# Patient Record
Sex: FEMALE | Race: OTHER | NOT HISPANIC OR LATINO | Employment: UNEMPLOYED | URBAN - METROPOLITAN AREA
[De-identification: names, ages, dates, MRNs, and addresses within clinical notes are randomized per-mention and may not be internally consistent; named-entity substitution may affect disease eponyms.]

---
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
## 2020-08-17 ENCOUNTER — TELEPHONE (OUTPATIENT)
Dept: OBGYN CLINIC | Facility: CLINIC | Age: 20
End: 2020-08-17

## 2020-09-22 ENCOUNTER — PROCEDURE VISIT (OUTPATIENT)
Dept: OBGYN CLINIC | Facility: CLINIC | Age: 20
End: 2020-09-22
Payer: COMMERCIAL

## 2020-09-22 VITALS
WEIGHT: 212 LBS | SYSTOLIC BLOOD PRESSURE: 116 MMHG | DIASTOLIC BLOOD PRESSURE: 82 MMHG | BODY MASS INDEX: 39.01 KG/M2 | HEIGHT: 62 IN | TEMPERATURE: 98.3 F

## 2020-09-22 DIAGNOSIS — Z30.46 NEXPLANON REMOVAL: Primary | ICD-10-CM

## 2020-09-22 DIAGNOSIS — Z11.3 ROUTINE SCREENING FOR STI (SEXUALLY TRANSMITTED INFECTION): ICD-10-CM

## 2020-09-22 DIAGNOSIS — Z30.011 ENCOUNTER FOR INITIAL PRESCRIPTION OF CONTRACEPTIVE PILLS: ICD-10-CM

## 2020-09-22 PROBLEM — Z00.00 VISIT FOR PREVENTIVE HEALTH EXAMINATION: Status: RESOLVED | Noted: 2018-07-23 | Resolved: 2020-09-22

## 2020-09-22 PROBLEM — J30.1 SEASONAL ALLERGIC RHINITIS DUE TO POLLEN: Status: RESOLVED | Noted: 2018-05-03 | Resolved: 2020-09-22

## 2020-09-22 PROBLEM — J06.9 VIRAL URI WITH COUGH: Status: RESOLVED | Noted: 2019-10-31 | Resolved: 2020-09-22

## 2020-09-22 PROCEDURE — 87491 CHLMYD TRACH DNA AMP PROBE: CPT | Performed by: NURSE PRACTITIONER

## 2020-09-22 PROCEDURE — 87591 N.GONORRHOEAE DNA AMP PROB: CPT | Performed by: NURSE PRACTITIONER

## 2020-09-22 PROCEDURE — 11982 REMOVE DRUG IMPLANT DEVICE: CPT | Performed by: NURSE PRACTITIONER

## 2020-09-22 RX ORDER — LEVONORGESTREL AND ETHINYL ESTRADIOL 0.1-0.02MG
1 KIT ORAL DAILY
Qty: 30 TABLET | Refills: 3 | Status: SHIPPED | OUTPATIENT
Start: 2020-09-22 | End: 2020-12-10

## 2020-09-22 NOTE — PATIENT INSTRUCTIONS
Safe sex  Safe Sex   WHAT YOU NEED TO KNOW:   What is safe sex? Safe sex is a combination of practices you can do to prevent pregnancy and the spread of sexually transmitted infections (STIs)  These practices help to decrease or prevent the exchange of body fluids during sexual contact  Body fluids include saliva, urine, blood, vaginal fluids, and semen  All types of sex can cause STIs  This includes oral, vaginal, and anal sex  How do I practice safe sex? Talk to your partner before you have sex  Ask about his or her sexual history and any current or past STI  · Use condoms and barrier methods for all types of sexual contact  Use a new condom or latex barrier each time you have sex  This includes oral, vaginal, and anal sex  Make sure that the condom fits and is put on correctly  Rubber latex sheets or dental dams can be used for oral sex  Ask your healthcare provider how to use these items and where to purchase them  If you are allergic to latex, use a nonlatex product such as polyurethane  · Limit your number of sexual partners  More than one sex partner can increase your risk for an STI  Do not have sex with anyone whose sexual history you do not know  · Do not do activities that can pass germs  Do not use saliva as a lubricant or share sex toys  · Tell your sex partner if you have an STI  Your partner may need to be tested and treated  Do not have sex while you are being treated for an STI, or with a partner who is being treated  · Get tested regularly for STIs  Get tested if you have had sexual contact with someone who has an STI  Get tested if you have unprotected sex with any new partner  · Get vaccinated  Vaccines may help to lower your risk for an STI such as HPV, hepatitis A, or hepatitis B  Ask your healthcare provider for more information on vaccines  How else can I practice safe sex? · Only use water-based lubricants during sex    Water-based lubricants may prevent sores or cuts in the vagina or penis  Prevent sores or cuts to decrease your risk for an STI  Do not use oil-based lubricants, such as baby oil or hand lotion, with latex condoms or barriers  These will weaken the latex and may cause it to break  · Do not use chemical irritants on condoms or genitals  Products that contain chemical irritants, such as spermicides, can irritate the lining of your vagina or rectum  Irritation may cause sores that may increase your risk for an STI  · Be careful when you have sex if you have open sores or cuts  Open sores or cuts may increase your risk for an STI  This includes new piercings and tattoos  Keep all open sores or cuts covered during sex  Do not have oral sex if you have cuts or sores in your mouth  Ask your healthcare provider when it is safe to have sex after you get a tattoo or piercing  · Do not use alcohol or drugs before sex  These substances can prevent you from thinking clearly and increase your risk for unsafe sex  Where can I find more information? · [id number] [address] ([employer])  [address]  Web Address: http://www "[employer]/  org  When should I seek immediate care? · A condom breaks, leaks, or slips off while you are having sex  · You notice sores on your penis, vagina, anal area, or the skin around them  · You have had unsafe sex and want to discuss emergency contraception or treatment for STI exposure  When should I contact my healthcare provider? · You think you might be pregnant  · You have questions or concerns about your condition or care  CARE AGREEMENT:   Informed consent  is a legal document that explains the tests, treatments, or procedures that you may need  Informed consent means you understand what will be done and can make decisions about what you want  You give your permission when you sign the consent form   You can have someone sign this form for you if you are not able to sign it  You have the right to understand your medical care in words you know  Before you sign the consent form, understand the risks and benefits of what will be done  Make sure all your questions are answered  The above information is an  only  It is not intended as medical advice for individual conditions or treatments  Talk to your doctor, nurse or pharmacist before following any medical regimen to see if it is safe and effective for you  © 2017 [address] Information is for End User's use only and may not be sold, redistributed or otherwise used for commercial purposes  All illustrations and images included in CareNotes® are the copyrighted property of [employer]  or [employer]  Levonorgestrel/Ethinyl Estradiol (By mouth)   Ethinyl Estradiol (ETH-i-nil es-tra-DYE-ol), Levonorgestrel ([id number]-hjl-ych-KAILASH-trel)  Prevents pregnancy  Brand Name(s): Kwame Piedra, Sahra Ledbetter, Felicia, Landry Becker, Richard Rodriguez, Susi, Irma [place], [place], [address], [place], [place]   There may be other brand names for this medicine  When This Medicine Should Not Be Used: You should not use this medicine if you have had an allergic reaction to hormone medicines  Do not use this medicine if you are pregnant, or if you have abnormal vaginal bleeding that has not been checked by a doctor  You should not use this medicine if you have a history of cancer of the breast, ovary, or uterus  Do not use this medicine if you have a history of heart attacks, stroke, or blood clots  You should not use this medicine if you have liver cancer, or a history of jaundice (yellow skin or eyes) caused by pregnancy or birth control pills  How to Use This Medicine:   Tablet  · Your doctor will tell you how much medicine to use  Do not use more than directed  · Read and follow the patient instructions that come with this medicine   Talk to your doctor or pharmacist if you have any questions  · Carefully follow your doctor's instructions about when to start taking your medicine  You may begin taking the pills on the first day of your menstrual period, or on the Sunday after your period begins  · You may need to use a second form of birth control when you first start using this medicine  Talk with your doctor about your individual situation  Some other forms of birth control include condoms, diaphragms, or contraceptive foams or jellies  · Take this medicine at the same time every day  Birth control pills work best when there is no more than 24 hours between doses  It is very important that you take this medicine on time every day  If a dose is missed:   · This medicine has specific patient instructions on what to do if you miss a dose  Read and follow these instructions carefully, and call your doctor if you have any questions  · If you miss one pink pill, take it as soon as you can  Then take your next pill at the regular time  This means you may take two pills in one day  · If you miss two pink pills in a row during Week 1 or 2, take two pills as soon as you can  Take two more pills on the next day  Then go back to your regular schedule of taking one pill every day  Use a second form of birth control until you have been taking pink pills for seven days in a row  · If you started this medicine on Day 1 of your period and you miss two pink pills in a row during Week 3, throw out the rest of your pills and start a new pack the same day  If you miss three or more pink pills in a row during any week, throw out the rest of your pills and start a new pack the same day  · If you started this medicine on the Sunday after your period started and you miss two pink pills in a row during Week 3, keep taking one pill every day until the next Sunday  Then throw away the rest of your pills and start a new pack on that same Sunday    · If you started this medicine on the Sunday after your period started and you miss three or more pink pills in a row during any week, keep taking one pill every day until the next Sunday  Then throw away the rest of your pills and start a new pack on that same Sunday  · If you miss your pills and change your schedule, you may not have a period for that month  Make sure your doctor knows if you miss your period two months in a row, because you may be pregnant  · You could have light bleeding or spotting any time you do not take a pill on time  The more pills you miss, the more likely you are to have bleeding  · To prevent a pregnancy, you should use a second form of birth control for the next seven days after you miss a dose  Some other forms of birth control include condoms, diaphragms, or contraceptive foams or jellies  How to Store and Dispose of This Medicine:   · Store the medicine in a closed container at room temperature, away from heat, moisture, and direct light  · Ask your pharmacist, doctor, or health caregiver about the best way to dispose of any outdated medicine or medicine no longer needed  · Keep all medicine out of the reach of children  Never share your medicine with anyone  Drugs and Foods to Avoid:   Ask your doctor or pharmacist before using any other medicine, including over-the-counter medicines, vitamins, and herbal products  · Make sure your doctor knows if you are using phenylbutazone (Butazolidin®) or rifampin (Rifadin®, Rimactane®)  Tell your doctor if you use seizure medicines such as phenobarbital or phenytoin (Dilantin®)  Make sure your doctor knows if you are using any antibiotics such as ampicillin, griseofulvin, or tetracycline  Warnings While Using This Medicine:   · Make sure your doctor knows if you are breast feeding, or have recently been pregnant  Tell your doctor if you have heart disease, high cholesterol, or high triglycerides (lipids)   Tell your doctor if you have migraine headaches, diabetes, gallbladder disease, or a history of depression  · This medicine may increase your risk of certain heart or blood vessel problems  Tell your doctor if you have a history of heart attack, stroke, high blood pressure, congestive heart failure, blood clots, or circulation problems  · Your risk of heart disease or stroke from this medicine is higher if you smoke  Your risk is also increased if you have diabetes or high cholesterol, or if you are overweight  Talk with your doctor about ways to stop smoking  Keep your diabetes under control  Ask your doctor about diet and exercise to control your weight and blood cholesterol level  · This medicine may also increase your risk of certain types of cancer  Talk to your doctor if you have concerns about this risk  · You might have some light bleeding or spotting when you first start using this medicine  This is usually normal and should not last long  However, if you have heavy bleeding or the bleeding lasts more than seven days in a row, call your doctor's office  You should not have a "normal" menstrual period until you start taking the white pills  The white pills are the last seven pills in your package and do not contain hormones  · Call your doctor for a pregnancy test if your menstrual period does not start when you take the last seven white pills  · Tell any doctor or dentist who treats you that you are using this medicine  This medicine may affect certain medical test results  · Your doctor will check your progress and the effects of this medicine at regular visits  Keep all appointments  · This medicine will not protect you from getting HIV, AIDS, or other sexually transmitted diseases  If this is a concern for you, talk with your doctor    Possible Side Effects While Using This Medicine:   Call your doctor right away if you notice any of these side effects:  · Allergic reaction: Itching or hives, swelling in your face or hands, swelling or tingling in your mouth or throat, chest tightness, trouble breathing  · Breast changes or lumps  · Decrease in how much or how often you urinate  · Chest pain, or coughing up blood  · Lighter or heavier menstrual periods  · Numbness or weakness in your arm or leg, or on one side of your body  · Pain in your lower leg (calf)  · Pain in your upper right stomach  · Shortness of breath, cold sweat, and bluish-colored skin  · Sudden or severe headache, problems with vision, speech, or walking  · Sweating, nausea or vomiting, pain in your chest, jaw, and left arm  · Swelling in your hands, ankles, or feet  · Unusual bleeding or bruising  · Yellowing of your skin or the whites of your eyes  If you notice these less serious side effects, talk with your doctor:   · Acne, increased non-scalp hair growth, or darkened skin on your face  · Breast pain, swelling, or tenderness  · Migraine headache, or a headache with vision changes or sensitivity to light  · Mood changes, depression, nervousness, or dizziness  · Nausea, vomiting, diarrhea, or upset stomach  · Pain or burning with urination  · Problems with vision, or trouble wearing contact lenses  · Vaginal itching or discharge  · Weight gain or loss, or appetite changes  If you notice other side effects that you think are caused by this medicine, tell your doctor  Call your doctor for medical advice about side effects  You may report side effects to FDA at 7-356-FDA-7155  © 2017 [address] [employer] Information is for End User's use only and may not be sold, redistributed or otherwise used for commercial purposes  The above information is an  only  It is not intended as medical advice for individual conditions or treatments  Talk to your doctor, nurse or pharmacist before following any medical regimen to see if it is safe and effective for you  Birth Control Pills   WHAT YOU NEED TO KNOW:   What are birth control pills?   Birth control pills are also called oral contraceptives, or the pill  It is medicine that helps prevent pregnancy  Birth control pills work by preventing ovulation  Ovulation is when the ovaries make and release an egg cell each month  If this egg gets fertilized by sperm, pregnancy occurs  Birth control pills may also help to prevent pregnancy by keeping sperm from fertilizing an egg  What may be done before I can start taking birth control pills? You need to see your healthcare provider to get a prescription  Any of the following may be done before your healthcare provider gives you a prescription:  · Your healthcare provider will ask you about diseases and illnesses you have had in the past  He will check your risk for blood clots, heart conditions, or stroke  He will also check your blood pressure, and may do a breast and pelvic exam  A Pap smear may also be done during the pelvic exam  This is a test to make sure you do not have abnormal changes on your cervix  You may need other tests, such as a urine test, to make sure you are not pregnant  · Your healthcare provider will ask if you take any medicines and if you smoke  Smoking increases your risk for stroke, heart attack, or a blood clot in your lungs  If you smoke, you should not take certain kinds of birth control pills  What are the advantages of birth control pills? When birth control pills are used correctly, the chances of getting pregnant are very low  Birth control pills may help decrease bleeding and pain during your monthly period  They may also help prevent cancer of the uterus and ovaries  What are the disadvantages of birth control pills? You may have sudden changes in your mood or feelings while you take birth control pills  You may have nausea and decreased sex drive  You may have an increased appetite and rapid weight gain  You may also have bleeding in between periods, less frequent periods, vaginal dryness, and breast pain   Birth control pills will not protect you from sexually transmitted infections  Rarely, some birth control pills can increase your risk for a blood clot  This may become life-threatening  What should I do if I decide I want to get pregnant? If you are planning to have a baby, ask your healthcare provider when you may stop taking your birth control pills  It may take some time for you to start ovulating again  Ask your healthcare provider for more information about pregnancy after birth control pills  When should I start taking birth control pills after I have a baby? If you are not breastfeeding, you may start taking birth control pills 3 weeks after you give birth  You may be able to take certain types of birth control pills if you are breastfeeding  These pills can be started from 6 weeks to 6 months after you give birth  Ask your healthcare provider for more information about when to start taking birth control pills after you give birth  When should I contact my healthcare provider? · You have forgotten to take a birth control pill  · You have mood changes, such as depression, since starting birth control pills  · You have nausea or you are vomiting  · You have severe abdominal pain  · You missed a period and have questions or concerns about being pregnant  · You still have bleeding 4 months after taking birth control pills correctly  · You have questions or concerns about your condition or care  When should I seek immediate care or call 911? · Your arm or leg feels warm, tender, and painful  It may look swollen and red  · You feel lightheaded, short of breath, and have chest pain  · You cough up blood  · You have any of the following signs of a stroke:      ¨ Numbness or drooping on one side of your face     ¨ Weakness in an arm or leg    ¨ Confusion or difficulty speaking    ¨ Dizziness, a severe headache, or vision loss    · You have severe pain, numbness, or swelling in your arms or legs    CARE AGREEMENT:   You have the right to help plan your care  Learn about your health condition and how it may be treated  Discuss treatment options with your caregivers to decide what care you want to receive  You always have the right to refuse treatment  The above information is an  only  It is not intended as medical advice for individual conditions or treatments  Talk to your doctor, nurse or pharmacist before following any medical regimen to see if it is safe and effective for you  © 2017 [address] Information is for End User's use only and may not be sold, redistributed or otherwise used for commercial purposes  All illustrations and images included in CareNotes® are the copyrighted property of [employer]  or [employer]  Levonorgestrel/Ethinyl Estradiol (By mouth)   Ethinyl Estradiol (ETH-i-nil es-tra-DYE-ol), Levonorgestrel ([id number]-pxj-djp-KAILASH-trel)  Prevents pregnancy  Brand Name(s): Marlon Martinez, Sahra Ledbetter, Felicia, Rosita, [place], Richard Rodriguez, Susi, Irma [place], [place], [address], [place], [place]   There may be other brand names for this medicine  When This Medicine Should Not Be Used: You should not use this medicine if you have had an allergic reaction to hormone medicines  Do not use this medicine if you are pregnant, or if you have abnormal vaginal bleeding that has not been checked by a doctor  You should not use this medicine if you have a history of cancer of the breast, ovary, or uterus  Do not use this medicine if you have a history of heart attacks, stroke, or blood clots  You should not use this medicine if you have liver cancer, or a history of jaundice (yellow skin or eyes) caused by pregnancy or birth control pills  How to Use This Medicine:   Tablet  · Your doctor will tell you how much medicine to use  Do not use more than directed  · Read and follow the patient instructions that come with this medicine   Talk to your doctor or pharmacist if you have any questions  · Carefully follow your doctor's instructions about when to start taking your medicine  You may begin taking the pills on the first day of your menstrual period, or on the Sunday after your period begins  · You may need to use a second form of birth control when you first start using this medicine  Talk with your doctor about your individual situation  Some other forms of birth control include condoms, diaphragms, or contraceptive foams or jellies  · Take this medicine at the same time every day  Birth control pills work best when there is no more than 24 hours between doses  It is very important that you take this medicine on time every day  If a dose is missed:   · This medicine has specific patient instructions on what to do if you miss a dose  Read and follow these instructions carefully, and call your doctor if you have any questions  · If you miss one pink pill, take it as soon as you can  Then take your next pill at the regular time  This means you may take two pills in one day  · If you miss two pink pills in a row during Week 1 or 2, take two pills as soon as you can  Take two more pills on the next day  Then go back to your regular schedule of taking one pill every day  Use a second form of birth control until you have been taking pink pills for seven days in a row  · If you started this medicine on Day 1 of your period and you miss two pink pills in a row during Week 3, throw out the rest of your pills and start a new pack the same day  If you miss three or more pink pills in a row during any week, throw out the rest of your pills and start a new pack the same day  · If you started this medicine on the Sunday after your period started and you miss two pink pills in a row during Week 3, keep taking one pill every day until the next Sunday  Then throw away the rest of your pills and start a new pack on that same Sunday    · If you started this medicine on the Sunday after your period started and you miss three or more pink pills in a row during any week, keep taking one pill every day until the next Sunday  Then throw away the rest of your pills and start a new pack on that same Sunday  · If you miss your pills and change your schedule, you may not have a period for that month  Make sure your doctor knows if you miss your period two months in a row, because you may be pregnant  · You could have light bleeding or spotting any time you do not take a pill on time  The more pills you miss, the more likely you are to have bleeding  · To prevent a pregnancy, you should use a second form of birth control for the next seven days after you miss a dose  Some other forms of birth control include condoms, diaphragms, or contraceptive foams or jellies  How to Store and Dispose of This Medicine:   · Store the medicine in a closed container at room temperature, away from heat, moisture, and direct light  · Ask your pharmacist, doctor, or health caregiver about the best way to dispose of any outdated medicine or medicine no longer needed  · Keep all medicine out of the reach of children  Never share your medicine with anyone  Drugs and Foods to Avoid:   Ask your doctor or pharmacist before using any other medicine, including over-the-counter medicines, vitamins, and herbal products  · Make sure your doctor knows if you are using phenylbutazone (Butazolidin®) or rifampin (Rifadin®, Rimactane®)  Tell your doctor if you use seizure medicines such as phenobarbital or phenytoin (Dilantin®)  Make sure your doctor knows if you are using any antibiotics such as ampicillin, griseofulvin, or tetracycline  Warnings While Using This Medicine:   · Make sure your doctor knows if you are breast feeding, or have recently been pregnant  Tell your doctor if you have heart disease, high cholesterol, or high triglycerides (lipids)   Tell your doctor if you have migraine headaches, diabetes, gallbladder disease, or a tightness, trouble breathing  · Breast changes or lumps  · Decrease in how much or how often you urinate  · Chest pain, or coughing up blood  · Lighter or heavier menstrual periods  · Numbness or weakness in your arm or leg, or on one side of your body  · Pain in your lower leg (calf)  · Pain in your upper right stomach  · Shortness of breath, cold sweat, and bluish-colored skin  · Sudden or severe headache, problems with vision, speech, or walking  · Sweating, nausea or vomiting, pain in your chest, jaw, and left arm  · Swelling in your hands, ankles, or feet  · Unusual bleeding or bruising  · Yellowing of your skin or the whites of your eyes  If you notice these less serious side effects, talk with your doctor:   · Acne, increased non-scalp hair growth, or darkened skin on your face  · Breast pain, swelling, or tenderness  · Migraine headache, or a headache with vision changes or sensitivity to light  · Mood changes, depression, nervousness, or dizziness  · Nausea, vomiting, diarrhea, or upset stomach  · Pain or burning with urination  · Problems with vision, or trouble wearing contact lenses  · Vaginal itching or discharge  · Weight gain or loss, or appetite changes  If you notice other side effects that you think are caused by this medicine, tell your doctor  Call your doctor for medical advice about side effects  You may report side effects to FDA at 9-960-FDA-4533  © 2017 [facility] Information is for End User's use only and may not be sold, redistributed or otherwise used for commercial purposes  The above information is an  only  It is not intended as medical advice for individual conditions or treatments  Talk to your doctor, nurse or pharmacist before following any medical regimen to see if it is safe and effective for you

## 2020-09-22 NOTE — PROGRESS NOTES
Remove and insert drug implant    Date/Time: 9/22/2020 3:35 PM  Performed by: LIVIA Chaudhry  Authorized by: LIVIA Chaudhry     Consent:     Consent obtained:  Verbal    Consent given by:  Patient    Procedural risks discussed:  Bleeding, failure rate, infection and repeat procedure    Patient questions answered: yes      Patient agrees, verbalizes understanding, and wants to proceed: yes      Educational handouts given: yes      Instructions and paperwork completed: yes    Universal Protocol:     Relevant documents present and verified: yes      Test results available and properly labeled: na       Imaging studies available: na       Required blood products, implants, devices, and special equipment available: yes      Site marked: yes    Indication:     Indication: Presence of non-biodegradable drug delivery implant    Pre-procedure:     Pre-procedure timeout performed: yes      Anesthesia premedication: none  Prepped with: alcohol 70% and povidone-iodine      Local anesthetic:  Lidocaine 1%    The site was cleaned and prepped in a sterile fashion: yes    Procedure:     Procedure:  Removal    Small stab incision was made in arm: yes      Left/right:  Left    Visualization of implant was obtained: yes      Site was closed with steri-strips and pressure bandage applied: yes    Comments:      G0 here for Nexplanon removal and to initiate OCPs  Nexplanon was placed 1/2018 is due for removal 1/2021  Patient not interested in replacement of Nexplanon is interested in starting OCPs  Reviewed risks, benefits and alternatives  Removal procedure reviewed  Patient desires removal today  Urine sent for gonorrhea/chlamydia  Reviewed with patient to start birth control pills tonight  One tablet daily  Reviewed with patient common side effect is breast tenderness and nausea  Reviewed with patient given Nexplanon use unsure when menses will become normal typically within the next 6 months  ACHES reviewed  Missed pill protocol reviewed  Written information provided  Removed without difficulty, intact  Signs and symptoms report reviewed  Reviewed recommendation for Gardasil vaccine  Written information provided  Patient is unsure she had vaccine series    RTO 1/2021 for OCP check and annual exam with 1st Pap smear f/u gardasil vaccine

## 2020-10-01 LAB
C TRACH DNA SPEC QL NAA+PROBE: NEGATIVE
N GONORRHOEA DNA SPEC QL NAA+PROBE: NEGATIVE

## 2020-12-10 ENCOUNTER — TELEMEDICINE (OUTPATIENT)
Dept: FAMILY MEDICINE CLINIC | Facility: CLINIC | Age: 20
End: 2020-12-10
Payer: COMMERCIAL

## 2020-12-10 VITALS — TEMPERATURE: 98.6 F

## 2020-12-10 DIAGNOSIS — B34.9 VIRAL INFECTION, UNSPECIFIED: Primary | ICD-10-CM

## 2020-12-10 PROCEDURE — 99214 OFFICE O/P EST MOD 30 MIN: CPT | Performed by: FAMILY MEDICINE

## 2021-02-08 ENCOUNTER — OFFICE VISIT (OUTPATIENT)
Dept: OBGYN CLINIC | Facility: CLINIC | Age: 21
End: 2021-02-08
Payer: COMMERCIAL

## 2021-02-08 ENCOUNTER — TELEPHONE (OUTPATIENT)
Dept: OBGYN CLINIC | Facility: CLINIC | Age: 21
End: 2021-02-08

## 2021-02-08 ENCOUNTER — NURSE TRIAGE (OUTPATIENT)
Dept: OTHER | Facility: OTHER | Age: 21
End: 2021-02-08

## 2021-02-08 VITALS — SYSTOLIC BLOOD PRESSURE: 118 MMHG | BODY MASS INDEX: 37.02 KG/M2 | DIASTOLIC BLOOD PRESSURE: 68 MMHG | WEIGHT: 202.4 LBS

## 2021-02-08 DIAGNOSIS — Z32.01 POSITIVE URINE PREGNANCY TEST: ICD-10-CM

## 2021-02-08 DIAGNOSIS — R11.2 NAUSEA AND VOMITING, INTRACTABILITY OF VOMITING NOT SPECIFIED, UNSPECIFIED VOMITING TYPE: Primary | ICD-10-CM

## 2021-02-08 PROCEDURE — 99213 OFFICE O/P EST LOW 20 MIN: CPT | Performed by: NURSE PRACTITIONER

## 2021-02-08 RX ORDER — ONDANSETRON 4 MG/1
4 TABLET, ORALLY DISINTEGRATING ORAL EVERY 8 HOURS SCHEDULED
Qty: 18 TABLET | Refills: 0 | Status: SHIPPED | OUTPATIENT
Start: 2021-02-08 | End: 2021-02-25 | Stop reason: SDUPTHER

## 2021-02-08 NOTE — PROGRESS NOTES
Assessment/Plan:    Nausea and vomiting  N/V x24 hours  Pos UPT; LMP 12/17/20  Discussed 1st trimester N/V of preg versus viral gastroenteritis  Offered rx for Zofran; reviewed risks/benefits, alternatives  Recommended ice chips then advance to small sips gatorade/h20; bland diet when tolerated  Reviewed sx of dehydration and reasons to call versus report to ED  Dating [address] is scheduled for later this week - advised to keep this  She verbalizes understanding and agrees to plan  Diagnoses and all orders for this visit:    Nausea and vomiting, intractability of vomiting not specified, unspecified vomiting type  -     ondansetron (ZOFRAN-ODT) 4 mg disintegrating tablet; Take 1 tablet (4 mg total) by mouth every 8 (eight) hours    Positive urine pregnancy test          Subjective:      Patient ID: Mimi Hurtado is a 24 y o  female  This new patient presents with c/o nausea and vomiting in 1st trimester of preg  She is transferring care from another [facility] practice  Shai Roberto is a 24 y o  G0  PMHx noncontrib  Gyn hx noncontrib  FH notable for maunt with breast ca; neg for ovarian or colon ca  LMP was 12/17/20  Pos UPT x3  Felt nauseated for about 1 week then started vomiting yesterday  Her emesis is only bile as of this morning   Denies fever, chills or sick contacts   She is voiding   Has not taken OTC meds   Denies bleeding or pelvic pain  She is scheduled for dating US on 2/11/21      The following portions of the patient's history were reviewed and updated as appropriate: allergies, current medications, past family history, past medical history, past social history, past surgical history and problem list     Review of Systems   Constitutional: Negative  Respiratory: Negative  Cardiovascular: Negative  Gastrointestinal: Positive for nausea and vomiting  Negative for abdominal pain, anal bleeding, blood in stool, constipation and diarrhea  Genitourinary: Negative  Musculoskeletal: Negative      Skin: Negative  Neurological: Negative  Psychiatric/Behavioral: Negative  Objective:      /68   Wt 91 8 kg (202 lb 6 4 oz)   LMP 12/17/2020   BMI 37 02 kg/m²          Physical Exam  Constitutional:       Appearance: She is well-developed  Comments: Well appearing    HENT:      Head: Normocephalic  Eyes:      Pupils: Pupils are equal, round, and reactive to light  Neck:      Musculoskeletal: Normal range of motion  Pulmonary:      Effort: Pulmonary effort is normal    Neurological:      Mental Status: She is alert and oriented to person, place, and time  Psychiatric:         Behavior: Behavior normal          Thought Content:  Thought content normal          Judgment: Judgment normal

## 2021-02-08 NOTE — TELEPHONE ENCOUNTER
nausea with early pregnancy pt is about 6wk pregnant   [facility]  Per Diann Fish she is going to put in an order for Zofran and that she doesn't need an office visit

## 2021-02-08 NOTE — TELEPHONE ENCOUNTER
Regarding: Vomitiing  ----- Message from Shayne Thomas sent at 2/8/2021  5:00 AM EST -----  " Patient is about 6 weeks pregnant and has a appointment this Thursday to confirm appointment  She has been vomiting for the last 24 hours  It's bile and spit  Unable to eat or drink anything   Patient states it started 3 days ago "

## 2021-02-08 NOTE — PATIENT INSTRUCTIONS
Nausea and Vomiting in Pregnancy   WHAT YOU NEED TO KNOW:   Nausea and vomiting can happen any time of day  These symptoms usually start before the 9th week of pregnancy, and end by the 14th week (second trimester)  Some women can have nausea and vomiting for a longer time  These symptoms can affect some women throughout the entire pregnancy  Nausea and vomiting do not harm your baby  These symptoms can make it hard for you to do your daily activities  DISCHARGE INSTRUCTIONS:   Return to the emergency department if:   · You have signs of dehydration  Examples are dark yellow urine, dry mouth and lips, dry skin, fast heartbeat, and urinating less than usual     · You have severe abdominal pain  · You feel too weak or dizzy to stand up  · You see blood in your vomit or bowel movements  Contact your healthcare provider if:   · You vomit more than 4 times in 1 day  · You have not been able to keep liquids down for more than 1 day  · You lose more than 2 pounds  · You have a fever  · Your nausea and vomiting continue longer than 14 weeks  · You have questions or concerns about your condition or care  Nutrition changes you can make to manage nausea and vomiting:   · Eat small meals throughout the day instead of 3 large meals  You may be more likely to have nausea and vomiting when your stomach is empty  Eat foods that are low in fat and high in protein  Examples are lean meat, beans, turkey, and chicken without the skin  Eat a small snack, such as crackers, dry cereal, or a small sandwich before you go to bed  · Eat some crackers or dry toast before you get out of bed in the morning  Get out of bed slowly  Sudden movements could cause you to get dizzy and nauseated  · Eat bland foods when you feel nauseated  Examples of bland foods include dry toast, dry cereal, plain pasta, white rice, and bread  Other bland foods include saltine crackers, bananas, gelatin, and pretzels   Avoid spicy, greasy, and fried foods  Avoid any other foods that make you feel nauseated  · Drink liquids that contain ginger  Drink ginger ale made with real ginger or ginger tea made with fresh grated ginger  Ginger capsules or ginger candies may also help to decrease nausea and vomiting  · Drink liquids between meals instead of with meals  Wait at least 30 minutes after you eat to drink liquids  Drink small amounts of liquids often throughout the day to prevent dehydration  Ask how much liquid you should drink each day  Other changes you can make to manage nausea and vomiting:   · Avoid smells that bother you  Strong odors may cause nausea and vomiting to start, or make it worse  Take a short walk, turn on a fan, or try to sleep with the window open to get fresh air  When you are cooking, open windows to get rid of smells that may cause nausea  · Do not brush your teeth right after you eat  if it makes you nauseated  · Rest when you need to  Start activity slowly and work up to your usual routine as you start to feel better  · Talk to your healthcare provider about your prenatal vitamins  Prenatal vitamins can cause nausea for some women  Try taking your prenatal vitamin at night or with a snack  If this change does not help, your healthcare provider may recommend a different type of vitamin  · Do not use any medicines, vitamins, or supplements to manage your symptoms without asking your healthcare provider  Many medicines can harm an unborn baby  · Light to moderate exercise  may help to decrease your symptoms  It may also help you to sleep better at night  Ask your healthcare provider about the best exercise plan for you  Follow up with your healthcare provider as directed:  Write down your questions so you remember to ask them during your visits     © Copyright [address] Information is for End User's use only and may not be sold, redistributed or otherwise used for commercial purposes  All illustrations and images included in CareNotes® are the copyrighted property of A D A M [employer] Inc  or [employer]  The above information is an  only  It is not intended as medical advice for individual conditions or treatments  Talk to your doctor, nurse or pharmacist before following any medical regimen to see if it is safe and effective for you

## 2021-02-08 NOTE — ASSESSMENT & PLAN NOTE
N/V x24 hours  Pos UPT; LMP 12/17/20  Discussed 1st trimester N/V of preg versus viral gastroenteritis  Offered rx for Zofran; reviewed risks/benefits, alternatives  Recommended ice chips then advance to small sips gatorade/h20; bland diet when tolerated  Reviewed sx of dehydration and reasons to call versus report to ED  Dating [address] is scheduled for later this week - advised to keep this  She verbalizes understanding and agrees to plan

## 2021-02-09 ENCOUNTER — TELEPHONE (OUTPATIENT)
Dept: OBGYN CLINIC | Facility: CLINIC | Age: 21
End: 2021-02-09

## 2021-02-09 ENCOUNTER — HOSPITAL ENCOUNTER (EMERGENCY)
Facility: HOSPITAL | Age: 21
Discharge: HOME | End: 2021-02-09
Attending: EMERGENCY MEDICINE | Admitting: EMERGENCY MEDICINE
Payer: COMMERCIAL

## 2021-02-09 VITALS
DIASTOLIC BLOOD PRESSURE: 66 MMHG | TEMPERATURE: 98.2 F | SYSTOLIC BLOOD PRESSURE: 133 MMHG | HEART RATE: 79 BPM | RESPIRATION RATE: 20 BRPM | OXYGEN SATURATION: 100 %

## 2021-02-09 DIAGNOSIS — R82.4 KETONURIA: ICD-10-CM

## 2021-02-09 DIAGNOSIS — O21.9 NAUSEA AND VOMITING IN PREGNANCY: Primary | ICD-10-CM

## 2021-02-09 LAB
ALBUMIN SERPL BCP-MCNC: 4 G/DL (ref 3.5–5)
ALP SERPL-CCNC: 53 U/L (ref 46–116)
ALT SERPL W P-5'-P-CCNC: 16 U/L (ref 12–78)
AMORPH URATE CRY URNS QL MICRO: ABNORMAL /HPF
ANION GAP SERPL CALCULATED.3IONS-SCNC: 6 MMOL/L (ref 4–13)
AST SERPL W P-5'-P-CCNC: 12 U/L (ref 5–45)
BACTERIA UR QL AUTO: ABNORMAL /HPF
BASOPHILS # BLD AUTO: 0.02 THOUSANDS/ΜL (ref 0–0.1)
BASOPHILS NFR BLD AUTO: 0 % (ref 0–1)
BILIRUB SERPL-MCNC: 0.54 MG/DL (ref 0.2–1)
BILIRUB UR QL STRIP: NEGATIVE
BUN SERPL-MCNC: 7 MG/DL (ref 5–25)
CALCIUM SERPL-MCNC: 10.2 MG/DL (ref 8.3–10.1)
CHLORIDE SERPL-SCNC: 106 MMOL/L (ref 100–108)
CLARITY UR: CLEAR
CO2 SERPL-SCNC: 25 MMOL/L (ref 21–32)
COLOR UR: YELLOW
COLOR, POC: NORMAL
CREAT SERPL-MCNC: 0.59 MG/DL (ref 0.6–1.3)
EOSINOPHIL # BLD AUTO: 0.08 THOUSAND/ΜL (ref 0–0.61)
EOSINOPHIL NFR BLD AUTO: 1 % (ref 0–6)
ERYTHROCYTE [DISTWIDTH] IN BLOOD BY AUTOMATED COUNT: 11.9 % (ref 11.6–15.1)
GFR SERPL CREATININE-BSD FRML MDRD: 131 ML/MIN/1.73SQ M
GLUCOSE SERPL-MCNC: 82 MG/DL (ref 65–140)
GLUCOSE UR STRIP-MCNC: NEGATIVE MG/DL
HCT VFR BLD AUTO: 38.6 % (ref 34.8–46.1)
HGB BLD-MCNC: 13.2 G/DL (ref 11.5–15.4)
HGB UR QL STRIP.AUTO: NEGATIVE
IMM GRANULOCYTES # BLD AUTO: 0.05 THOUSAND/UL (ref 0–0.2)
IMM GRANULOCYTES NFR BLD AUTO: 0 % (ref 0–2)
KETONES UR STRIP-MCNC: ABNORMAL MG/DL
LEUKOCYTE ESTERASE UR QL STRIP: ABNORMAL
LYMPHOCYTES # BLD AUTO: 2.71 THOUSANDS/ΜL (ref 0.6–4.47)
LYMPHOCYTES NFR BLD AUTO: 22 % (ref 14–44)
MCH RBC QN AUTO: 29.6 PG (ref 26.8–34.3)
MCHC RBC AUTO-ENTMCNC: 34.2 G/DL (ref 31.4–37.4)
MCV RBC AUTO: 87 FL (ref 82–98)
MONOCYTES # BLD AUTO: 0.84 THOUSAND/ΜL (ref 0.17–1.22)
MONOCYTES NFR BLD AUTO: 7 % (ref 4–12)
NEUTROPHILS # BLD AUTO: 8.91 THOUSANDS/ΜL (ref 1.85–7.62)
NEUTS SEG NFR BLD AUTO: 70 % (ref 43–75)
NITRITE UR QL STRIP: NEGATIVE
NON-SQ EPI CELLS URNS QL MICRO: ABNORMAL /HPF
NRBC BLD AUTO-RTO: 0 /100 WBCS
OTHER STN SPEC: ABNORMAL
PH UR STRIP.AUTO: 5.5 [PH] (ref 4.5–8)
PLATELET # BLD AUTO: 393 THOUSANDS/UL (ref 149–390)
PMV BLD AUTO: 9.1 FL (ref 8.9–12.7)
POTASSIUM SERPL-SCNC: 3.7 MMOL/L (ref 3.5–5.3)
PROT SERPL-MCNC: 7.8 G/DL (ref 6.4–8.2)
PROT UR STRIP-MCNC: ABNORMAL MG/DL
RBC # BLD AUTO: 4.46 MILLION/UL (ref 3.81–5.12)
RBC #/AREA URNS AUTO: ABNORMAL /HPF
SODIUM SERPL-SCNC: 137 MMOL/L (ref 136–145)
SP GR UR STRIP.AUTO: >=1.03 (ref 1–1.03)
UROBILINOGEN UR QL STRIP.AUTO: 0.2 E.U./DL
WBC # BLD AUTO: 12.61 THOUSAND/UL (ref 4.31–10.16)
WBC #/AREA URNS AUTO: ABNORMAL /HPF

## 2021-02-09 PROCEDURE — 96361 HYDRATE IV INFUSION ADD-ON: CPT

## 2021-02-09 PROCEDURE — 76815 OB US LIMITED FETUS(S): CPT | Performed by: EMERGENCY MEDICINE

## 2021-02-09 PROCEDURE — 81001 URINALYSIS AUTO W/SCOPE: CPT

## 2021-02-09 PROCEDURE — 87086 URINE CULTURE/COLONY COUNT: CPT

## 2021-02-09 PROCEDURE — 96375 TX/PRO/DX INJ NEW DRUG ADDON: CPT

## 2021-02-09 PROCEDURE — 96365 THER/PROPH/DIAG IV INF INIT: CPT

## 2021-02-09 PROCEDURE — 80053 COMPREHEN METABOLIC PANEL: CPT | Performed by: EMERGENCY MEDICINE

## 2021-02-09 PROCEDURE — 85025 COMPLETE CBC W/AUTO DIFF WBC: CPT | Performed by: EMERGENCY MEDICINE

## 2021-02-09 PROCEDURE — 99284 EMERGENCY DEPT VISIT MOD MDM: CPT | Performed by: EMERGENCY MEDICINE

## 2021-02-09 PROCEDURE — 36415 COLL VENOUS BLD VENIPUNCTURE: CPT | Performed by: EMERGENCY MEDICINE

## 2021-02-09 PROCEDURE — 99284 EMERGENCY DEPT VISIT MOD MDM: CPT

## 2021-02-09 RX ORDER — DOXYLAMINE SUCCINATE AND PYRIDOXINE HYDROCHLORIDE, DELAYED RELEASE TABLETS 10 MG/10 MG 10; 10 MG/1; MG/1
10 TABLET, DELAYED RELEASE ORAL 2 TIMES DAILY
Qty: 12 TABLET | Refills: 0 | Status: SHIPPED | OUTPATIENT
Start: 2021-02-09 | End: 2021-02-25

## 2021-02-09 RX ORDER — METOCLOPRAMIDE 10 MG/1
10 TABLET ORAL EVERY 6 HOURS
Qty: 12 TABLET | Refills: 0 | Status: SHIPPED | OUTPATIENT
Start: 2021-02-09 | End: 2021-02-09

## 2021-02-09 RX ORDER — ONDANSETRON 2 MG/ML
4 INJECTION INTRAMUSCULAR; INTRAVENOUS ONCE
Status: COMPLETED | OUTPATIENT
Start: 2021-02-09 | End: 2021-02-09

## 2021-02-09 RX ORDER — DOXYLAMINE SUCCINATE AND PYRIDOXINE HYDROCHLORIDE, DELAYED RELEASE TABLETS 10 MG/10 MG 10; 10 MG/1; MG/1
10 TABLET, DELAYED RELEASE ORAL 2 TIMES DAILY
Qty: 14 TABLET | Refills: 0 | Status: SHIPPED | OUTPATIENT
Start: 2021-02-09 | End: 2021-02-09

## 2021-02-09 RX ORDER — METOCLOPRAMIDE 10 MG/1
10 TABLET ORAL EVERY 6 HOURS
Qty: 12 TABLET | Refills: 0 | Status: SHIPPED | OUTPATIENT
Start: 2021-02-09 | End: 2021-03-23 | Stop reason: SDUPTHER

## 2021-02-09 RX ADMIN — ONDANSETRON 4 MG: 2 INJECTION INTRAMUSCULAR; INTRAVENOUS at 15:12

## 2021-02-09 RX ADMIN — DEXTROSE AND SODIUM CHLORIDE 1000 ML: 5; .9 INJECTION, SOLUTION INTRAVENOUS at 17:19

## 2021-02-09 RX ADMIN — SODIUM CHLORIDE 1000 ML: 0.9 INJECTION, SOLUTION INTRAVENOUS at 15:12

## 2021-02-09 NOTE — Clinical Note
Nav Dao was seen and treated in our emergency department on 2/9/2021  Diagnosis:     Anna Armstrong  may return to work on return date  She may return on this date: 02/11/2021         If you have any questions or concerns, please don't hesitate to call        Pradeep Yee MD    ______________________________           _______________          _______________  Hospital Representative                              Date                                Time

## 2021-02-09 NOTE — ED PROCEDURE NOTE
Procedure  POC Pelvic US    Date/Time: 2/9/2021 3:05 PM  Performed by: Erika Ramirez MD  Authorized by: Erika Ramirez MD     Patient location:  ED  Other Assisting Provider: Yes (comment) (Dr Elan Dudley)    Procedure details:     Exam Type:  Diagnostic    Indications: evaluate for IUP      Assessment for: confirm intrauterine pregnancy      Technique:  Transabdominal obstetric (HCG+) exam    Views obtained: uterus (transverse and sagittal)      Image quality: diagnostic      Image availability:  Images available in PACS  Uterine findings:     Intrauterine pregnancy: identified      Single gestation: identified      Yolk sac: identified      Fetal pole: identified    Other findings:     Free pelvic fluid: not identified    Interpretation:     Ultrasound impressions: normal      Pregnancy findings: intrauterine pregnancy (IUP)                       Erika Ramirez MD  02/09/21 1502

## 2021-02-09 NOTE — ED PROVIDER NOTES
History  Chief Complaint   Patient presents with    Vomiting During Pregnancy     7wks pregnancy vomiting for weeks, OBGYN told her to come to ED for fluids  26-year-old female  presents for vomiting during first-trimester pregnancy estimated to be around 7 weeks based on last menstrual period here with nausea and vomiting  Patient says nausea started 4 days ago and she started vomiting 2 days ago  She has had an episode of vomiting every 2 hours as of this morning  She has been taking Zofran without relief  Has some mild lower abdominal cramping in the suprapubic area, nonradiating, 3/10 severity, intermittent, worse with episodes of vomiting  Denies fever, chills, cough, chest pain, SOB, diarrhea, urinary symptoms, back or flank pain, vaginal bleeding or discharge, headache, any other complaints  She has not had an ultrasound at this point  Prior to Admission Medications   Prescriptions Last Dose Informant Patient Reported? Taking?   ondansetron (ZOFRAN-ODT) 4 mg disintegrating tablet 2021 at Unknown time  No Yes   Sig: Take 1 tablet (4 mg total) by mouth every 8 (eight) hours      Facility-Administered Medications: None       History reviewed  No pertinent past medical history  History reviewed  No pertinent surgical history  Family History   Problem Relation Age of Onset    No Known Problems Mother     Diabetes Paternal Grandmother     Breast cancer Maternal Aunt      I have reviewed and agree with the history as documented  E-Cigarette/Vaping    E-Cigarette Use Current Some Day User      E-Cigarette/Vaping Substances    Nicotine Yes     THC No     CBD Yes     Flavoring Yes     Other No     Unknown No      Social History     Tobacco Use    Smoking status: Never Smoker    Smokeless tobacco: Never Used   Substance Use Topics    Alcohol use: No    Drug use: No        Review of Systems   Constitutional: Negative  Negative for chills and fever  HENT: Negative  Negative for rhinorrhea  Eyes: Negative  Respiratory: Negative  Negative for cough and shortness of breath  Cardiovascular: Negative  Negative for chest pain and leg swelling  Gastrointestinal: Positive for nausea and vomiting  Negative for abdominal pain and diarrhea  Genitourinary: Positive for pelvic pain  Negative for dysuria, flank pain and frequency  Musculoskeletal: Negative  Negative for back pain and neck pain  Skin: Negative  Negative for rash  Neurological: Negative  Negative for light-headedness and headaches  All other systems reviewed and are negative  Physical Exam  ED Triage Vitals [02/09/21 1443]   Temperature Pulse Respirations Blood Pressure SpO2   98 2 °F (36 8 °C) 79 20 133/66 100 %      Temp Source Heart Rate Source Patient Position - Orthostatic VS BP Location FiO2 (%)   Oral -- -- -- --      Pain Score       --             Orthostatic Vital Signs  Vitals:    02/09/21 1443   BP: 133/66   Pulse: 79       Physical Exam  Vitals signs and nursing note reviewed  Constitutional:       General: She is not in acute distress  Appearance: She is well-developed  HENT:      Head: Normocephalic and atraumatic  Mouth/Throat:      Mouth: Mucous membranes are moist    Eyes:      Pupils: Pupils are equal, round, and reactive to light  Neck:      Musculoskeletal: Normal range of motion and neck supple  Cardiovascular:      Rate and Rhythm: Normal rate and regular rhythm  Pulses:           Radial pulses are 2+ on the right side and 2+ on the left side  Heart sounds: Normal heart sounds  No murmur  No friction rub  No gallop  Pulmonary:      Effort: Pulmonary effort is normal  No respiratory distress  Breath sounds: Normal breath sounds  No stridor  No wheezing, rhonchi or rales  Abdominal:      General: Abdomen is flat  Palpations: Abdomen is soft  Tenderness: There is no abdominal tenderness  There is no guarding or rebound  Musculoskeletal: Normal range of motion  General: No swelling or tenderness  Right lower leg: No edema  Left lower leg: No edema  Skin:     General: Skin is warm and dry  Capillary Refill: Capillary refill takes less than 2 seconds  Neurological:      Mental Status: She is alert and oriented to person, place, and time  Cranial Nerves: No cranial nerve deficit  Comments: Clear fluent speech         ED Medications  Medications   sodium chloride 0 9 % bolus 1,000 mL (0 mL Intravenous Stopped 2/9/21 1715)   ondansetron (ZOFRAN) injection 4 mg (4 mg Intravenous Given 2/9/21 1512)   dextrose 5 % and sodium chloride 0 9 % bolus 1,000 mL (0 mL Intravenous Stopped 2/9/21 1828)       Diagnostic Studies  Results Reviewed     Procedure Component Value Units Date/Time    Urine Microscopic [[id number]]  (Abnormal) Collected: 02/09/21 1712    Lab Status: Final result Specimen: Urine, Clean Catch Updated: 02/09/21 1802     RBC, UA None Seen /hpf      WBC, UA 4-10 /hpf      Epithelial Cells Moderate /hpf      Bacteria, UA Occasional /hpf      AMORPH URATES Moderate /hpf      OTHER OBSERVATIONS Yeast Cells Present    Urine culture [[id number]] Collected: 02/09/21 1712    Lab Status:  In process Specimen: Urine, Clean Catch Updated: 02/09/21 1719    POCT urinalysis dipstick [[id number]]  (Normal) Resulted: 02/09/21 1715    Lab Status: Final result Updated: 02/09/21 1715     Color, UA see results    Urine Macroscopic, POC [[id number]]  (Abnormal) Collected: 02/09/21 1712    Lab Status: Final result Specimen: Urine Updated: 02/09/21 1713     Color, UA Yellow     Clarity, UA Clear     pH, UA 5 5     Leukocytes, UA Trace     Nitrite, UA Negative     Protein, UA Trace mg/dl      Glucose, UA Negative mg/dl      Ketones, UA >=160 (4+) mg/dl      Urobilinogen, UA 0 2 E U /dl      Bilirubin, UA Negative     Blood, UA Negative     Specific Gravity, UA >=1 030    Narrative:      CLINITEK RESULT    Comprehensive metabolic panel [[id number]]  (Abnormal) Collected: 02/09/21 1512    Lab Status: Final result Specimen: Blood from Arm, Right Updated: 02/09/21 1546     Sodium 137 mmol/L      Potassium 3 7 mmol/L      Chloride 106 mmol/L      CO2 25 mmol/L      ANION GAP 6 mmol/L      BUN 7 mg/dL      Creatinine 0 59 mg/dL      Glucose 82 mg/dL      Calcium 10 2 mg/dL      AST 12 U/L      ALT 16 U/L      Alkaline Phosphatase 53 U/L      Total Protein 7 8 g/dL      Albumin 4 0 g/dL      Total Bilirubin 0 54 mg/dL      eGFR 131 ml/min/1 73sq m     Narrative:      National Kidney Disease Foundation guidelines for Chronic Kidney Disease (CKD):     Stage 1 with normal or high GFR (GFR > 90 mL/min/1 73 square meters)    Stage 2 Mild CKD (GFR = 60-89 mL/min/1 73 square meters)    Stage 3A Moderate CKD (GFR = 45-59 mL/min/1 73 square meters)    Stage 3B Moderate CKD (GFR = 30-44 mL/min/1 73 square meters)    Stage 4 Severe CKD (GFR = 15-29 mL/min/1 73 square meters)    Stage 5 End Stage CKD (GFR <15 mL/min/1 73 square meters)  Note: GFR calculation is accurate only with a steady state creatinine    CBC and differential [[id number]]  (Abnormal) Collected: 02/09/21 1512    Lab Status: Final result Specimen: Blood from Arm, Right Updated: 02/09/21 1522     WBC 12 61 Thousand/uL      RBC 4 46 Million/uL      Hemoglobin 13 2 g/dL      Hematocrit 38 6 %      MCV 87 fL      MCH 29 6 pg      MCHC 34 2 g/dL      RDW 11 9 %      MPV 9 1 fL      Platelets 194 Thousands/uL      nRBC 0 /100 WBCs      Neutrophils Relative 70 %      Immat GRANS % 0 %      Lymphocytes Relative 22 %      Monocytes Relative 7 %      Eosinophils Relative 1 %      Basophils Relative 0 %      Neutrophils Absolute 8 91 Thousands/µL      Immature Grans Absolute 0 05 Thousand/uL      Lymphocytes Absolute 2 71 Thousands/µL      Monocytes Absolute 0 84 Thousand/µL      Eosinophils Absolute 0 08 Thousand/µL      Basophils Absolute 0 02 Thousands/µL                  No orders to display         Procedures  Procedures      ED Course                                       MDM  Number of Diagnoses or Management Options  Ketonuria:   Nausea and vomiting in pregnancy:   Diagnosis management comments: 26-year-old female  presents for vomiting during first-trimester pregnancy estimated to be around 7 weeks based on last menstrual period with nausea and vomiting  Patient has ketones on urinalysis but Chem panel is reassuring  Gave her a L of normal saline followed by D5 normal saline  Patient feels improved after IV Zofran and is tolerating oral intake  She says her nausea is resolved at this time and would like to go home  Sending home with prescriptions for antiemetics and close OB follow-up  Strict ED return precautions provided should symptoms worsen and patient can otherwise follow up outpatient  Patient expresses an understanding and agreement with the plan and remains in good condition for discharge  Disposition  Final diagnoses:   Nausea and vomiting in pregnancy   Ketonuria     Time reflects when diagnosis was documented in both MDM as applicable and the Disposition within this note     Time User Action Codes Description Comment    2021  4:57 PM Minor, Jane Add [O21 0] Hyperemesis gravidarum     2021  4:57 PM Minor, Jane Add [O21 9] Nausea and vomiting in pregnancy     2021  4:57 PM Minor, Jane Modify [O21 9] Nausea and vomiting in pregnancy     2021  4:57 PM Minor, Jane Remove [O21 0] Hyperemesis gravidarum     2021  5:15 PM Minor, Jane Add [R82 4] [facility]       ED Disposition     ED Disposition Condition Date/Time Comment    Discharge Stable  2021  6:23 PM Landry Navas discharge to home/self care              Follow-up Information     Follow up With Specialties Details Why Contact Info Additional Information    your OBGYN  Call in 1 day       [address] [place] [address] Emergency Department Emergency Medicine Go to  If symptoms worsen [address] [place] [address] Emergency Department, [address] , [place], [place], [address]   [phone]          Patient's Medications   Discharge Prescriptions    DOXYLAMINE-PYRIDOXINE 10-10 MG TBEC    Take 10 mg by mouth 2 (two) times a day       Start Date: 2/9/2021  End Date: --       Order Dose: 10 mg       Quantity: 12 tablet    Refills: 0    METOCLOPRAMIDE (REGLAN) 10 MG TABLET    Take 1 tablet (10 mg total) by mouth every 6 (six) hours       Start Date: 2/9/2021  End Date: --       Order Dose: 10 mg       Quantity: 12 tablet    Refills: 0     No discharge procedures on file  PDMP Review     None           ED Provider  Attending physically available and evaluated Teola Spatz I managed the patient along with the ED Attending      Electronically Signed by         Maria Elias MD  02/09/21 6166

## 2021-02-09 NOTE — ED ATTENDING ATTESTATION
2021  I, Cyndie Sanchez MD, saw and evaluated the patient  I have discussed the patient with the resident and agree with the resident's findings, Plan of Care, and MDM as documented in the resident's note, unless otherwise documented below  All available laboratory and imaging studies were reviewed by myself  I was present for key portions of any procedure(s) performed by the resident and I was immediately available to provide assistance  I agree with the current assessment done in the Emergency Department  I have conducted an independent evaluation of this patient  80-year-old  female in 1st trimester pregnancy at approximately 7 weeks EGA, LMP 2020, presenting with nausea and vomiting  Over the past 4 days, patient has developed nausea and has had multiple episodes of emesis for the past 2 days, consisting of stomach contents and yellow stomach fluid  No fevers or chills  Patient has tried Zofran ODT which worked for the past several days but not within the past 2 days  She has not had any diarrhea  She does have lower abdominal cramping that is nonradiating  No dysuria, no vaginal discharge or bleeding  No lightheadedness, no fevers or chills, no body aches, no chest pain, no shortness of breath  Patient does have a sick contact:  Her significant other is currently sick with a diarrheal illness  She does not believe she has had exposure to COVID and would like to defer testing for today  No prior medical history, no prior abdominal surgeries      ROS:  Constitutional: denies fevers, chills  HENT:  Reports increased sense of smell  Cardiac: no chest pain  Respiratory: no shortness of breath, no cough  GI:  Lower abdominal cramping, nausea, vomiting, no diarrhea  : no urinary frequency, urgency, or burning with urination  Heme/Onc: no easy bruising  Endocrine: no diabetes  Neuro: no weakness or numbness, no headaches    Ten systems reviewed and negative unless otherwise noted in HPI and above      Physical Exam  Vitals:    02/09/21 1443   BP: 133/66   TempSrc: Oral   Pulse: 79   Resp: 20   Temp: 98 2 °F (36 8 °C)     SPO2 RA Rest      ED from 2/9/2021 in [address] [place] [address] Emergency Department   SpO2  100 %   SpO2 Activity  --   O2 Device  --   O2 Flow Rate  --        Constitutional:  Awake, alert, oriented  No acute distress  HEENT:  Normocephalic, atraumatic  Sclera anicteric, conjunctiva not injected  Moist oral mucosa  Cardiac:  Regular rate and rhythm, no murmurs, rubs, or gallops  2+ radial pulses  Respiratory:  Lungs are clear to auscultation bilaterally, no wheezes or rales  Abdomen:  Nondistended  Bowel sounds present  No tenderness to palpation  No rebound or guarding  Extremities:  No deformities, no edema  Integument:  No rashes over exposed areas, cap refill less than 2 seconds  Neurologic:  Awake, alert, and oriented x3    Nonfocal exam   Psychiatric:  Normal affect    Labs  Labs Reviewed   CBC AND DIFFERENTIAL - Abnormal       Result Value Ref Range Status    WBC 12 61 (*) 4 31 - 10 16 Thousand/uL Final    RBC 4 46  3 81 - 5 12 Million/uL Final    Hemoglobin 13 2  11 5 - 15 4 g/dL Final    Hematocrit 38 6  34 8 - 46 1 % Final    MCV 87  82 - 98 fL Final    MCH 29 6  26 8 - 34 3 pg Final    MCHC 34 2  31 4 - 37 4 g/dL Final    RDW 11 9  11 6 - 15 1 % Final    MPV 9 1  8 9 - 12 7 fL Final    Platelets 115 (*) 807 - 390 Thousands/uL Final    nRBC 0  /100 WBCs Final    Neutrophils Relative 70  43 - 75 % Final    Immat GRANS % 0  0 - 2 % Final    Lymphocytes Relative 22  14 - 44 % Final    Monocytes Relative 7  4 - 12 % Final    Eosinophils Relative 1  0 - 6 % Final    Basophils Relative 0  0 - 1 % Final    Neutrophils Absolute 8 91 (*) 1 85 - 7 62 Thousands/µL Final    Immature Grans Absolute 0 05  0 00 - 0 20 Thousand/uL Final    Lymphocytes Absolute 2 71  0 60 - 4 47 Thousands/µL Final    Monocytes Absolute 0 84  0 17 - 1 22 Thousand/µL Final Eosinophils Absolute 0 08  0 00 - 0 61 Thousand/µL Final    Basophils Absolute 0 02  0 00 - 0 10 Thousands/µL Final   COMPREHENSIVE METABOLIC PANEL - Abnormal    Sodium 137  136 - 145 mmol/L Final    Potassium 3 7  3 5 - 5 3 mmol/L Final    Chloride 106  100 - 108 mmol/L Final    CO2 25  21 - 32 mmol/L Final    ANION GAP 6  4 - 13 mmol/L Final    BUN 7  5 - 25 mg/dL Final    Creatinine 0 59 (*) 0 60 - 1 30 mg/dL Final    Comment: Standardized to IDMS reference method    Glucose 82  65 - 140 mg/dL Final    Comment: If the patient is fasting, the ADA then defines impaired fasting glucose as > 100 mg/dL and diabetes as > or equal to 123 mg/dL  Specimen collection should occur prior to Sulfasalazine administration due to the potential for falsely depressed results  Specimen collection should occur prior to Sulfapyridine administration due to the potential for falsely elevated results  Calcium 10 2 (*) 8 3 - 10 1 mg/dL Final    AST 12  5 - 45 U/L Final    Comment: Specimen collection should occur prior to Sulfasalazine administration due to the potential for falsely depressed results  ALT 16  12 - 78 U/L Final    Comment: Specimen collection should occur prior to Sulfasalazine and/or Sulfapyridine administration due to the potential for falsely depressed results  Alkaline Phosphatase 53  46 - 116 U/L Final    Total Protein 7 8  6 4 - 8 2 g/dL Final    Albumin 4 0  3 5 - 5 0 g/dL Final    Total Bilirubin 0 54  0 20 - 1 00 mg/dL Final    Comment: Use of this assay is not recommended for patients undergoing treatment with eltrombopag due to the potential for falsely elevated results      eGFR 131  ml/min/1 73sq m Final    Narrative:     [place] guidelines for Chronic Kidney Disease (CKD):     Stage 1 with normal or high GFR (GFR > 90 mL/min/1 73 square meters)    Stage 2 Mild CKD (GFR = 60-89 mL/min/1 73 square meters)    Stage 3A Moderate CKD (GFR = 45-59 mL/min/1 73 square meters)    Stage 3B Moderate CKD (GFR = 30-44 mL/min/1 73 square meters)    Stage 4 Severe CKD (GFR = 15-29 mL/min/1 73 square meters)    Stage 5 End Stage CKD (GFR <15 mL/min/1 73 square meters)  Note: GFR calculation is accurate only with a steady state creatinine   URINE MICROSCOPIC - Abnormal    RBC, UA None Seen  None Seen, 2-4 /hpf Final    WBC, UA 4-10 (*) None Seen, 2-4 /hpf Final    Epithelial Cells Moderate (*) None Seen, Occasional /hpf Final    Bacteria, UA Occasional  None Seen, Occasional /hpf Final    AMORPH URATES Moderate  /hpf Final    OTHER OBSERVATIONS Yeast Cells Present   Final   URINE MACROSCOPIC, POC - Abnormal    Color, UA Yellow   Final    Clarity, UA Clear   Final    pH, UA 5 5  4 5 - 8 0 Final    Leukocytes, UA Trace (*) Negative Final    Nitrite, UA Negative  Negative Final    Protein, UA Trace (*) Negative mg/dl Final    Glucose, UA Negative  Negative mg/dl Final    Ketones, UA >=160 (4+) (*) Negative mg/dl Final    Urobilinogen, UA 0 2  0 2, 1 0 E U /dl E U /dl Final    Bilirubin, UA Negative  Negative Final    Blood, UA Negative  Negative Final    Specific Gravity, UA >=1 030  1 003 - 1 030 Final    Narrative:     CLINITEK RESULT   POCT URINALYSIS DIPSTICK - Normal    Color, UA see results   Final       ED Course  Medications   sodium chloride 0 9 % bolus 1,000 mL (0 mL Intravenous Stopped 2/9/21 1715)   ondansetron (ZOFRAN) injection 4 mg (4 mg Intravenous Given 2/9/21 1512)   dextrose 5 % and sodium chloride 0 9 % bolus 1,000 mL (0 mL Intravenous Stopped 2/9/21 1828)       51-year-old female in 1st trimester pregnancy presenting with nausea and vomiting as well as some lower abdominal cramping  Vital signs reviewed, afebrile and within normal limits  Differential diagnosis includes nausea in 1st trimester pregnancy, hyperemesis gravidarum, nausea/vomiting due to early gastroenteritis such as due to viral illness, COVID-19, versus another etiology of symptoms    Patient appears to have isolated nausea and vomiting without diarrhea, myalgias, or fevers  She would like to defer COVID-19 testing today which I feel is appropriate  Limited bedside ultrasound performed by the ultrasound team reveals an intrauterine yolk sac, fetal pole is not yet visualized likely due to early dates  1 L normal saline started, Zofran IV administered  CMP reveals no electrolyte abnormalities, intact renal function, no transaminitis  CBCs with mild leukocytosis of 12 61, mild thrombocytosis of 393 of unclear clinical significance, within normal limits otherwise  We are awaiting urine to rule out asymptomatic bacteriuria as well as to evaluate specific gravity  Plan to discharge patient to home likely with p o  Reglan as well as p o  Unisom to help her sleep at night she reports difficulty sleeping at night secondary to nausea/vomiting  Recommend follow-up with OBGYN  Patient discharged to home with recommendations for symptom control, return precautions, and plan for follow up        Clinical Impression  Final diagnoses:   Nausea and vomiting in pregnancy   Ketonuria       Discharge Medication List as of 2/9/2021  5:00 PM      START taking these medications    Details   Doxylamine-Pyridoxine 10-10 MG TBEC Take 10 mg by mouth 2 (two) times a day for 7 days, Starting Tue 2/9/2021, Until Tue 2/16/2021, Print      metoclopramide (REGLAN) 10 mg tablet Take 1 tablet (10 mg total) by mouth every 6 (six) hours, Starting Tue 2/9/2021, Print         CONTINUE these medications which have NOT CHANGED    Details   ondansetron (ZOFRAN-ODT) 4 mg disintegrating tablet Take 1 tablet (4 mg total) by mouth every 8 (eight) hours, Starting Mon 2/8/2021, Normal

## 2021-02-09 NOTE — TELEPHONE ENCOUNTER
Aziza Ramirez called, she has taken 3 doses of Zofran, 1st dose helped, 2nd and 3rd no relief at all from the nausea  She is unable to keep any fluids down, urine is dark and she feels lightheaded  I spoke with Jagjit Rios and advised patient if she is feeling dehydrated she should go the ER

## 2021-02-10 ENCOUNTER — TELEPHONE (OUTPATIENT)
Dept: OBGYN CLINIC | Facility: CLINIC | Age: 21
End: 2021-02-10

## 2021-02-10 LAB — BACTERIA UR CULT: NORMAL

## 2021-02-10 NOTE — TELEPHONE ENCOUNTER
Spoke to pt and let her know no US tomorrow , its too early  She is feeling better but still wants to keep appt

## 2021-02-10 NOTE — TELEPHONE ENCOUNTER
In chart review she was in ED yesterday for N/V and had early bedside U/S  In review of images she has a GS measuring 6w0d  (not 8w she is supposed to be by dates)  She is on my schedule for U/S tomorrow AM  Please let her know she will not get an U/S tomorrow AM as too early  She is welcome to come in for visit to discuss N/V but she needs to have an U/S in 2 weeks    Thanks

## 2021-02-12 ENCOUNTER — HOSPITAL ENCOUNTER (EMERGENCY)
Facility: HOSPITAL | Age: 21
Discharge: HOME | End: 2021-02-12
Attending: EMERGENCY MEDICINE | Admitting: EMERGENCY MEDICINE
Payer: COMMERCIAL

## 2021-02-12 VITALS
DIASTOLIC BLOOD PRESSURE: 65 MMHG | SYSTOLIC BLOOD PRESSURE: 132 MMHG | HEART RATE: 86 BPM | BODY MASS INDEX: 37.17 KG/M2 | RESPIRATION RATE: 18 BRPM | TEMPERATURE: 98.1 F | OXYGEN SATURATION: 98 % | HEIGHT: 62 IN | WEIGHT: 202 LBS

## 2021-02-12 DIAGNOSIS — K59.00 CONSTIPATION: ICD-10-CM

## 2021-02-12 DIAGNOSIS — O21.9 NAUSEA AND VOMITING IN PREGNANCY: Primary | ICD-10-CM

## 2021-02-12 PROCEDURE — 99284 EMERGENCY DEPT VISIT MOD MDM: CPT | Performed by: EMERGENCY MEDICINE

## 2021-02-12 PROCEDURE — 96365 THER/PROPH/DIAG IV INF INIT: CPT

## 2021-02-12 PROCEDURE — 99283 EMERGENCY DEPT VISIT LOW MDM: CPT

## 2021-02-12 PROCEDURE — 96366 THER/PROPH/DIAG IV INF ADDON: CPT

## 2021-02-12 PROCEDURE — 96375 TX/PRO/DX INJ NEW DRUG ADDON: CPT

## 2021-02-12 RX ORDER — METOCLOPRAMIDE 10 MG/1
10 TABLET ORAL EVERY 6 HOURS
Qty: 120 TABLET | Refills: 0 | Status: SHIPPED | OUTPATIENT
Start: 2021-02-12 | End: 2021-03-14

## 2021-02-12 RX ORDER — METOCLOPRAMIDE HYDROCHLORIDE 5 MG/ML
10 INJECTION INTRAMUSCULAR; INTRAVENOUS ONCE
Status: COMPLETED | OUTPATIENT
Start: 2021-02-12 | End: 2021-02-12

## 2021-02-12 RX ORDER — SODIUM CHLORIDE, SODIUM GLUCONATE, SODIUM ACETATE, POTASSIUM CHLORIDE, MAGNESIUM CHLORIDE, SODIUM PHOSPHATE, DIBASIC, AND POTASSIUM PHOSPHATE .53; .5; .37; .037; .03; .012; .00082 G/100ML; G/100ML; G/100ML; G/100ML; G/100ML; G/100ML; G/100ML
1000 INJECTION, SOLUTION INTRAVENOUS ONCE
Status: COMPLETED | OUTPATIENT
Start: 2021-02-12 | End: 2021-02-12

## 2021-02-12 RX ORDER — DOCUSATE SODIUM 100 MG/1
100 CAPSULE, LIQUID FILLED ORAL EVERY 12 HOURS
Qty: 60 CAPSULE | Refills: 0 | Status: SHIPPED | OUTPATIENT
Start: 2021-02-12 | End: 2021-04-22

## 2021-02-12 RX ADMIN — SODIUM CHLORIDE, SODIUM GLUCONATE, SODIUM ACETATE, POTASSIUM CHLORIDE, MAGNESIUM CHLORIDE, SODIUM PHOSPHATE, DIBASIC, AND POTASSIUM PHOSPHATE 1000 ML: .53; .5; .37; .037; .03; .012; .00082 INJECTION, SOLUTION INTRAVENOUS at 07:57

## 2021-02-12 RX ADMIN — METOCLOPRAMIDE 10 MG: 5 INJECTION, SOLUTION INTRAMUSCULAR; INTRAVENOUS at 07:54

## 2021-02-12 NOTE — ED PROVIDER NOTES
History  Chief Complaint   Patient presents with    Vomiting During Pregnancy     6weeks, was up all night vomiting, medications from doctor for night time not working     25 y/o female, 6 weeks pregnant , presents to the ED for evaluation of nausea and vomiting x 1 week  She was prescribed Zofran by her OBGYN 4 days ago  She was seen in the ED 3 days ago for her symptoms and was discharged with Reglan and doxylamine-pyridoxine; bedside ultrasound at that time confirmed intrauterine pregnancy, approximately 6 weeks  She states that states that the Reglan worked very well for her nausea, but she ran out of the prescription and Zofran she was prescribed has been causing her constipation  She denies fever, chills, shortness of breath, cough, chest pain, abdominal pain, hematemesis, diarrhea, urinary symptoms, vaginal bleeding/discharge, and pelvic pain  Prior to Admission Medications   Prescriptions Last Dose Informant Patient Reported? Taking? Doxylamine-Pyridoxine 10-10 MG TBEC Past Week at Unknown time  No Yes   Sig: Take 10 mg by mouth 2 (two) times a day   metoclopramide (REGLAN) 10 mg tablet 2021 at Unknown time  No Yes   Sig: Take 1 tablet (10 mg total) by mouth every 6 (six) hours   ondansetron (ZOFRAN-ODT) 4 mg disintegrating tablet Past Week at Unknown time  No Yes   Sig: Take 1 tablet (4 mg total) by mouth every 8 (eight) hours      Facility-Administered Medications: None       Past Medical History:   Diagnosis Date    Anxiety     Depression        History reviewed  No pertinent surgical history  Family History   Problem Relation Age of Onset    No Known Problems Mother     Diabetes Paternal Grandmother     Breast cancer Maternal Aunt      I have reviewed and agree with the history as documented      E-Cigarette/Vaping    E-Cigarette Use Former User      E-Cigarette/Vaping Substances    Nicotine Yes     THC No     CBD Yes     Flavoring Yes     Other No     Unknown No Social History     Tobacco Use    Smoking status: Never Smoker    Smokeless tobacco: Never Used   Substance Use Topics    Alcohol use: No    Drug use: No        Review of Systems   Constitutional: Negative for chills and fever  HENT: Negative for congestion and sore throat  Respiratory: Negative for cough and shortness of breath  Cardiovascular: Negative for chest pain and palpitations  Gastrointestinal: Positive for constipation, nausea and vomiting  Negative for abdominal pain and diarrhea  Genitourinary: Negative for dysuria, hematuria, pelvic pain, vaginal bleeding and vaginal discharge  Musculoskeletal: Negative for back pain and neck pain  Neurological: Negative for dizziness, light-headedness and headaches  All other systems reviewed and are negative  Physical Exam  ED Triage Vitals [02/12/21 0718]   Temperature Pulse Respirations Blood Pressure SpO2   98 1 °F (36 7 °C) 89 20 137/65 98 %      Temp src Heart Rate Source Patient Position - Orthostatic VS BP Location FiO2 (%)   -- -- -- -- --      Pain Score       --             Orthostatic Vital Signs  Vitals:    02/12/21 0718 02/12/21 0929   BP: 137/65 132/65   Pulse: 89 86       Physical Exam  Vitals signs and nursing note reviewed  Constitutional:       General: She is not in acute distress  Appearance: Normal appearance  She is normal weight  HENT:      Head: Normocephalic and atraumatic  Right Ear: External ear normal       Left Ear: External ear normal       Nose: Nose normal       Mouth/Throat:      Mouth: Mucous membranes are moist       Pharynx: Oropharynx is clear  Eyes:      Extraocular Movements: Extraocular movements intact  Conjunctiva/sclera: Conjunctivae normal       Pupils: Pupils are equal, round, and reactive to light  Neck:      Musculoskeletal: Normal range of motion and neck supple  No muscular tenderness  Cardiovascular:      Rate and Rhythm: Normal rate and regular rhythm  Pulses: Normal pulses  Heart sounds: Normal heart sounds  Pulmonary:      Effort: Pulmonary effort is normal       Breath sounds: Normal breath sounds  Abdominal:      General: Abdomen is flat  Bowel sounds are normal       Palpations: Abdomen is soft  Tenderness: There is no abdominal tenderness  There is no right CVA tenderness, left CVA tenderness or guarding  Musculoskeletal: Normal range of motion  General: No swelling or tenderness  Skin:     General: Skin is warm and dry  Capillary Refill: Capillary refill takes less than 2 seconds  Neurological:      General: No focal deficit present  Mental Status: She is alert and oriented to person, place, and time  ED Medications  Medications   multi-electrolyte (ISOLYTE-S PH 7 4) bolus 1,000 mL (0 mL Intravenous Stopped 21 1000)   metoclopramide (REGLAN) injection 10 mg (10 mg Intravenous Given 21 0754)       Diagnostic Studies  Results Reviewed     None                 No orders to display         Procedures  Procedures      ED Course                                       MDM  Number of Diagnoses or Management Options  Constipation:   Nausea and vomiting in pregnancy:   Diagnosis management comments: 25 y/o female, 6 weeks pregnant , presenting to the ED for evaluation of nausea and vomiting x 1 week  She was prescribed Zofran by her OBGYN 4 days ago  She was seen in the ED 3 days ago for her symptoms and was discharged with Reglan and doxylamine-pyridoxine; bedside ultrasound at that time confirmed intrauterine pregnancy, approximately 6 weeks  She states that states that the Reglan worked very well for her nausea, but she ran out of the prescription and Zofran she was prescribed has been causing her constipation  No fever, chills, abdominal pain, hematemesis, diarrhea, urinary symptoms, pelvic pain, or vaginal bleeding/discharge   On exam she is afebrile/VSS, with soft, non-tender abdomen; remainder of exam is within normal limits  Prior bedside US 3 days ago confirms IUP  Patient given IV fluids, IV Reglan, with improvement of symptoms  Patient able to tolerate PO  Discussed home care, including taking antiemetics 30 minutes prior to meals, as well and return precautions and outpatient follow up and the patient understands and agrees  Patient given prescription for Reglan PO as well as Colace for stool softening/constipation  Disposition  Final diagnoses:   Nausea and vomiting in pregnancy   Constipation     Time reflects when diagnosis was documented in both MDM as applicable and the Disposition within this note     Time User Action Codes Description Comment    2/12/2021  9:41 AM Judeth Ruddy Add [O21 9] Nausea and vomiting in pregnancy     2/12/2021  9:52 AM Judeth Ruddy Add [K59 00] Constipation       ED Disposition     ED Disposition Condition Date/Time Comment    Discharge Stable Fri Feb 12, 2021  9:41 AM Celeste Harrell discharge to home/self care  Follow-up Information     Follow up With Specialties Details Why Contact Info    Briana Hawkins MD Family Medicine Call  As needed [address]  [facility] [address]  [phone]            Discharge Medication List as of 2/12/2021  9:56 AM      START taking these medications    Details   docusate sodium (COLACE) 100 mg capsule Take 1 capsule (100 mg total) by mouth every 12 (twelve) hours, Starting Fri 2/12/2021, Normal      !! metoclopramide (REGLAN) 10 mg tablet Take 1 tablet (10 mg total) by mouth every 6 (six) hours, Starting Fri 2/12/2021, Until Sun 3/14/2021, Normal       !! - Potential duplicate medications found  Please discuss with provider        CONTINUE these medications which have NOT CHANGED    Details   Doxylamine-Pyridoxine 10-10 MG TBEC Take 10 mg by mouth 2 (two) times a day, Starting Tue 2/9/2021, Normal      !! metoclopramide (REGLAN) 10 mg tablet Take 1 tablet (10 mg total) by mouth every 6 (six) hours, Starting Tue 2/9/2021, Normal      ondansetron (ZOFRAN-ODT) 4 mg disintegrating tablet Take 1 tablet (4 mg total) by mouth every 8 (eight) hours, Starting Mon 2/8/2021, Normal       !! - Potential duplicate medications found  Please discuss with provider  No discharge procedures on file  PDMP Review     None           ED Provider  Attending physically available and evaluated Nav Dao I managed the patient along with the ED Attending      Electronically Signed by         Sabrina Ching MD  02/12/21 4596

## 2021-02-12 NOTE — DISCHARGE INSTRUCTIONS
You were seen in the ER for nausea and vomiting in pregnancy  We gave you IV fluids and nausea medication (Reglan)  As we discussed, this nausea and vomiting is typical in early pregnancy and the medications you have been prescribed will help with the nausea, but not necessarily eliminate it entirely; the goal is to make your symptoms manageable enough to tolerate food and fluids  If you develop new or worsening symptoms, please return to the ER, otherwise follow up with your PCP and OBGYN

## 2021-02-14 NOTE — ED ATTENDING ATTESTATION
2021  I, Sushila Vanessa MD, saw and evaluated the patient  I have discussed the patient with the resident/non-physician practitioner and agree with the resident's/non-physician practitioner's findings, Plan of Care, and MDM as documented in the resident's/non-physician practitioner's note, except where noted  All available labs and Radiology studies were reviewed  I was present for key portions of any procedure(s) performed by the resident/non-physician practitioner and I was immediately available to provide assistance  At this point I agree with the current assessment done in the Emergency Department  I have conducted an independent evaluation of this patient a history and physical is as follows:    ED Course     19-year-old female  presents with hyperemesis gravidarum states Zofran is not helping however she has had success with Reglan p r n  Vitals reviewed patient is well-appearing nontoxic    Heart regular rate rhythm  Lungs clear to auscultation bilaterally abdomen soft nontender nondistended normal bowel sounds  Moist mucous membranes  Impression:  Hyperemesis gravidarum  Give patient IV fluids antiemetics reassess anticipate discharge home close follow-up with OBGYN as scheduled  Patient discharged with a prescription for Reglan           Critical Care Time  Procedures

## 2021-02-25 ENCOUNTER — ULTRASOUND (OUTPATIENT)
Dept: OBGYN CLINIC | Facility: CLINIC | Age: 21
End: 2021-02-25
Payer: COMMERCIAL

## 2021-02-25 VITALS — SYSTOLIC BLOOD PRESSURE: 116 MMHG | WEIGHT: 193 LBS | BODY MASS INDEX: 35.3 KG/M2 | DIASTOLIC BLOOD PRESSURE: 74 MMHG

## 2021-02-25 DIAGNOSIS — R11.2 NAUSEA AND VOMITING, INTRACTABILITY OF VOMITING NOT SPECIFIED, UNSPECIFIED VOMITING TYPE: ICD-10-CM

## 2021-02-25 DIAGNOSIS — O36.80X0 ENCOUNTER TO DETERMINE FETAL VIABILITY OF PREGNANCY, SINGLE OR UNSPECIFIED FETUS: Primary | ICD-10-CM

## 2021-02-25 PROCEDURE — 99213 OFFICE O/P EST LOW 20 MIN: CPT | Performed by: NURSE PRACTITIONER

## 2021-02-25 PROCEDURE — 76817 TRANSVAGINAL US OBSTETRIC: CPT | Performed by: NURSE PRACTITIONER

## 2021-02-25 RX ORDER — ONDANSETRON 4 MG/1
4 TABLET, ORALLY DISINTEGRATING ORAL EVERY 8 HOURS SCHEDULED
Qty: 18 TABLET | Refills: 0 | Status: SHIPPED | OUTPATIENT
Start: 2021-02-25 | End: 2021-03-23 | Stop reason: SDUPTHER

## 2021-02-25 NOTE — ASSESSMENT & PLAN NOTE
Using Reglan and sometimes Zofran  Still having N/V -states up to 4 times a day  Reports a 20+ pound weight loss  Does not appear dehydrated  Advised to take both meds -overlapping same  Gatorade, pregnancy pops  To call if no improvement on Monday for Zofran infusions  Agrees to same

## 2021-02-25 NOTE — PATIENT INSTRUCTIONS
Pregnancy at 7 to [address]:   What changes are happening in my body? Pregnancy hormones may cause your body to go through many changes during this stage of your pregnancy  You may have any of the following:  · Fatigue    · Tender, swollen breasts    · Nausea or vomiting (morning sickness)    · Mood swings    · Frequent urination     · Headache    · Heartburn or constipation    · Weight gain or loss    · Cravings for certain foods or dislike of foods you normally eat    How do I care for myself at this stage of my pregnancy? · Manage nausea and vomiting  Avoid fatty and spicy foods  Eat small meals throughout the day instead of large meals  Chrissie may help to decrease nausea  Ask your healthcare provider about other ways of decreasing nausea and vomiting  · Eat a variety of healthy foods  Healthy foods include fruits, vegetables, whole-grain breads, low-fat dairy foods, beans, lean meats, and fish  Drink liquids as directed  Ask how much liquid to drink each day and which liquids are best for you  Limit caffeine to less than 200 milligrams each day  Limit your intake of fish to 2 servings each week  Choose fish low in mercury such as canned light tuna, shrimp, salmon, cod, or tilapia  Do not  eat fish high in mercury such as swordfish, tilefish, shea mackerel, and shark  · Take prenatal vitamins as directed  Your need for certain vitamins and minerals, such as folic acid, increases during pregnancy  Prenatal vitamins provide some of the extra vitamins and minerals you need  Prenatal vitamins may also help to decrease the risk of certain birth defects  · Ask how much weight you should gain each month  Too much or too little weight gain can be unhealthy for you and your baby  · Do not smoke  Smoking increases your risk of a miscarriage and other health problems during your pregnancy  Smoking can cause your baby to be born too early or weigh less at birth   Quit smoking as soon as you think you might be pregnant  Ask your healthcare provider for information if you need help quitting  · Do not drink alcohol  Alcohol passes from your body to your baby through the placenta  It can affect your baby's brain development and cause fetal alcohol syndrome (FAS)  FAS is a group of conditions that causes mental, behavior, and growth problems  · Talk to your healthcare provider before you take any medicines  Many medicines may harm your baby if you take them when you are pregnant  Do not take any medicines, vitamins, herbs, or supplements without first talking to your healthcare provider  Never use illegal or street drugs (such as marijuana or cocaine) while you are pregnant  What are some safety tips during pregnancy? · Avoid hot tubs and saunas  Do not use a hot tub or sauna while you are pregnant, especially during your first trimester  Hot tubs and saunas may raise your baby's temperature and increase the risk of birth defects  · Avoid toxoplasmosis  This is an infection caused by eating raw meat or being around infected cat feces  It can cause birth defects, miscarriages, and other problems  Wash your hands after you touch raw meat  Make sure any meat is well-cooked before you eat it  Avoid raw eggs and unpasteurized milk  Use gloves or ask someone else to clean your cat's litter box while you are pregnant  What changes are happening with my baby? By 10 weeks, your baby will be about 2½ inches long from the top of the head to the rump (baby's bottom)  Your baby weighs about ½ ounce  Major body organs, such as the brain, heart, and lungs, are forming  Your baby's facial features are also starting to form  What do I need to know about prenatal care? Prenatal care is a series of visits with your healthcare provider throughout your pregnancy  During the first 28 weeks of your pregnancy, you will see your healthcare provider 1 time each month   Prenatal care can help prevent problems during pregnancy and childbirth  Your healthcare provider will check your blood pressure and weight  Your baby's heart rate will also be checked  You may also need the following at some visits:  · A pelvic exam  allows your healthcare provider to see your cervix (the bottom part of your uterus)  Your healthcare provider will use a speculum to open your vagina  He or she will check the size and shape of your uterus  You may also have a Pap smear at your first prenatal visit  This is a test to check your cervix for abnormal cells  · Blood tests  may be done to check for any of the following:     ? Gestational diabetes or anemia (low iron level)    ? Blood type or Rh factor, or certain birth defects    ? Immunity to certain diseases, such as chickenpox or rubella    ? An infection, such as a sexually transmitted infection, HIV, or hepatitis B    · Hepatitis B  may need to be prevented or treated  Hepatitis B is inflammation of the liver caused by the hepatitis B virus (HBV)  HBV can spread from a mother to her baby during delivery  You will be checked for HBV as early as possible in the first trimester of each pregnancy  You need the test even if you received the hepatitis B vaccine or were tested before  You may need to have an HBV infection treated before you give birth  · Urine tests  may also be done to check for sugar and protein  These can be signs of gestational diabetes or preeclampsia  Urine tests may also be done to check for signs of infection  · A fetal ultrasound  shows pictures of your baby inside your uterus  The pictures are used to check your baby's development, movement, and position  · Genetic disorder screening tests  may be offered to you  These screening tests check your baby's risk for genetic disorders such as Down syndrome  A screening test includes a blood test and ultrasound  When should I seek immediate care?    · You have pain or cramping in your abdomen or low back  · You have heavy vaginal bleeding or clotting  · You pass material that looks like tissue or large clots  Collect the material and bring it with you  When should I call my doctor or obstetrician? · You have light bleeding  · You have chills or a fever  · You have vaginal itching, burning, or pain  · You have yellow, green, white, or foul-smelling vaginal discharge  · You have pain or burning when you urinate, less urine than usual, or pink or bloody urine  · You have questions or concerns about your condition or care  CARE AGREEMENT:   You have the right to help plan your care  Learn about your health condition and how it may be treated  Discuss treatment options with your healthcare providers to decide what care you want to receive  You always have the right to refuse treatment  The above information is an  only  It is not intended as medical advice for individual conditions or treatments  Talk to your doctor, nurse or pharmacist before following any medical regimen to see if it is safe and effective for you  © Copyright [address] Information is for End User's use only and may not be sold, redistributed or otherwise used for commercial purposes   All illustrations and images included in CareNotes® are the copyrighted property of [facility] [employer]  or [address] [place] [address]

## 2021-02-25 NOTE — PROGRESS NOTES
Patient is here for early U/S  LMP 12/17/2020  Periods are regular  This is her first pregnancy  Pregnancy not planned  She states she has nausea, vomiting and has also had numbness in her right leg in the past week  She declines the flu vaccine  She is accompanied by Vish Prior

## 2021-02-25 NOTE — PROGRESS NOTES
Assessment/Plan:    Encounter to determine fetal viability of pregnancy    Deirdre Landeros  is a 24 y o  Ermelinda Pacer who presents for early ultrasound  Single IUP @ 8w3d consistent with U/S done 2/9 (8w2d by that U/S)  and not C/W LMP  Use VALENTÍN of 10/5/21  Unplanned but welcome pregnancy  Having N/V -using Zofran and Reglan  Will schedule OB intake and prenatal one  Encouraged to continue PNV  Discussed avoiding teratogens  Declines Flu vaccine   To notify us with any bleeding or pelvic pain  Verbalized understanding  Nausea and vomiting  Using Reglan and sometimes Zofran  Still having N/V -states up to 4 times a day  Reports a 20+ pound weight loss  Does not appear dehydrated  Advised to take both meds -overlapping same  Gatorade, pregnancy pops  To call if no improvement on Monday for Zofran infusions  Agrees to same  Diagnoses and all orders for this visit:    Encounter to determine fetal viability of pregnancy, single or unspecified fetus    Nausea and vomiting, intractability of vomiting not specified, unspecified vomiting type  -     ondansetron (ZOFRAN-ODT) 4 mg disintegrating tablet; Take 1 tablet (4 mg total) by mouth every 8 (eight) hours    Other orders  -     Prenatal Vit-Fe Fumarate-FA (PRENATAL VITAMIN PO); Take by mouth        Subjective:      Patient ID: Georgiann Galeazzi is a 24 y o  female  Monserratshon Katiana EARLY PREGNANCY ULTRASOUND     Ultrasound Probe Disinfection     A transvaginal ultrasound was performed  Prior to use, disinfection was performed with High Level Disinfection Process (Trophon)  Probe serial number SLOGA-B: [id number] was used     Tita BHAKTA  2/25/21        SUBJECTIVE     HPI: Georgiann Galeazzi is a 24 y o  female here today for early pregnancy ultrasound  Patient's last menstrual period was 12/17 (exact date)    Menses are regular  She is accompanied by [facility]     OB history is significant for:   # 1 current   Medical history is significant for: none         Taking a prenatal vitamin  No Known Allergies           OBJECTIVE  /74 (BP Location: Right arm, Patient Position: Sitting, Cuff Size: Large)   Wt 87 5 kg (193 lb)   LMP 12/17/2020   BMI 35 30 kg/m²           Early OB Ultrasound Procedure Note: Transvaginal US     Technician: Study performed by the interpreting CRNP     Indications:  Early gestation, dating & viability     Procedure Details   The entire study was done at settings of 6 0 to 8 0 MHz  Gestational Sac: Present  Yolk sac: Present  Crown-rump length is 1 90 cm and calculates to an estimated gestational age of  6 weeks,3 days  Embryonic cardiac activity is seen at a rate of 173 b/min    Description of fetal anatomy Normal     Cul-de-sac: no fluid  Left ovary: not appreciated  Right ovary: not appreciated     Findings:  Viable, langford intrauterine pregnancy        ASSESSMENT  Early pregnancy at 8 weeks 3 days with a calculated VALENTÍN of 10/5/21 based on early ultrasound consistent with today's ultrasound

## 2021-03-09 ENCOUNTER — TELEPHONE (OUTPATIENT)
Dept: OBGYN CLINIC | Facility: CLINIC | Age: 21
End: 2021-03-09

## 2021-03-09 NOTE — TELEPHONE ENCOUNTER
Patient did not answer call this morning for her scheduled prenatal intake  Attempted to call x 2  Left message to call back  Routed to clerical pool to call to reschedule  PN1 3/23  I have placed order for MFM/ Nuchal Translucency US and all prenatal labs  Please have patient call MFM to schedule as this is time sensitive

## 2021-03-10 ENCOUNTER — TELEMEDICINE (OUTPATIENT)
Dept: OBGYN CLINIC | Facility: CLINIC | Age: 21
End: 2021-03-10

## 2021-03-10 DIAGNOSIS — Z34.01 ENCOUNTER FOR SUPERVISION OF NORMAL FIRST PREGNANCY IN FIRST TRIMESTER: Primary | ICD-10-CM

## 2021-03-10 PROCEDURE — OBC: Performed by: NURSE PRACTITIONER

## 2021-03-10 NOTE — PROGRESS NOTES
OB INTAKE INTERVIEW  * Pt presents for OB intake  * Accompanied by: phone interview  *  *Hx of  delivery prior to 36 weeks 6 days: no  *Last Menstrual Period: Pt's LMP was: 2020  *Ultrasound date:2021   8weeks 3days  *Estimated date of delivery: 10/5/2021   * Confirmed by: US    *Signs/Symptoms of Pregnancy   *Current pregnancy symptoms: N & V   *Constipation - no   *Headaches - no   *Cramping/spotting - no   *PICA cravings - no    *Diabetes- If you answer YES to 1 of the following, please order 1 hour GTT, 50g    *History of GDM: no    *BMI >35: yes    *History of PCOS and/or on Metformin: no    *Prior history of LGA/Macrosomia (>9lb): no    -If you answer YES to 2 or more of the following, please order 1 hour GTT, 50g    *BMI >30: yes  *First degree relative with type 2 diabetes: no    *AMA with other risk factors: no    *History of CHTN, Hyperlipidemia, Elevated A1c: no    *High risk race (, , ,  or ): no    *Hypertension- if you answer yes, please order preeclampsia labs including 24 hour urine protein   *Hx of chronic HTN: no   *Hx of gestational HTN: no   *Hx of preeclampsia, eclampsia, or HELLP syndrome: no    *Infection Screening-    *Does the pt have a hx of MRSA?: no    *If yes- please follow MRSA protocol and obtain a nasal swab for MRSA culture   *History of herpes?: no   *Ok for blood transfusion: yes    *Immunizations:   *Influenza vaccine given today: yes  Pt declined   *Discussed Tdap vaccine:    *Interview education   *[facility] Pregnancy Essentials Book reviewed and discussed   *Handouts given:    *[facility] support center    *[facility]     *Discussed genetic testing: yes     *Appointment at [facility] made: no  Pt to call for an appt      *Routine Prenatal lab work ordered: yes     *Did patients risk factors prompt additional lab work at this time?: yes   One hour glucola     *Nurse/Family Partnership- pt may qualify no; referral placed no     *4 P's- substance abuse screening    Presently using? no    Past use? marijuana    Partner using? No  Hx of marijuana    Parents/Family using? no    *Details that I feel the provider should be aware of:  Pt is a       PN1 visit scheduled  The patient was oriented to our practice and all questions were answered  PN phone interview completed  Pregnancy unplanned, but happy  PN bldwk was already ordered- one hour glucola added to existing bldwk;(pts' BMI is > 35 ) encouraged pt to complete prior to PN1 appt  Referral was entered for genetic testing, nuchal translucency  [facility] phone number given to pt- will call today for an appt  Declined flu vaccine    Pt states" reglan & zofran helping with N & V "    Interviewed by: Jen No RN, [address] [place] [address]

## 2021-03-10 NOTE — TELEPHONE ENCOUNTER
Took incoming call from patient  Pt informed of wolfgang's note  Pt agreed to have lab completed today at [facility] [address] [facility]  Pt confirmed she received a call from [facility] to schedule Nuchal and she agreed to call them back to schedule  Pt informed she is scheduled for telephone prenatal intake for today at 1 pm with Samuel who is aware and agreed to perform  Pt agreed to answer phone at 1 pm and complete ob intake today  (chrissy to schedule in for today)    Pt was calling due to had intercourse 20 mins ago and stated to have bleeding enough for a liner, color red, like a light period  Pt informed normal  Pt informed to monitor if bleeding decreases in the next 24 hours she is ok and if bleeding increases or new sx ie cramping to call the office  Pt agreed  Pt informed to complete labs advised asap as we would need to know her blood type   Pt informed if negative blood type she will need a rhogam injection due to the bleeding  Pt agreed to have lab completed  Pt furthermore informed I will forward her information to a provider to see if there are any other recommendations

## 2021-03-22 ENCOUNTER — APPOINTMENT (OUTPATIENT)
Dept: LAB | Age: 21
End: 2021-03-22
Payer: COMMERCIAL

## 2021-03-22 DIAGNOSIS — Z34.01 ENCOUNTER FOR SUPERVISION OF NORMAL FIRST PREGNANCY IN FIRST TRIMESTER: ICD-10-CM

## 2021-03-22 LAB
ABO GROUP BLD: NORMAL
BACTERIA UR QL AUTO: ABNORMAL /HPF
BASOPHILS # BLD AUTO: 0.01 THOUSANDS/ΜL (ref 0–0.1)
BASOPHILS NFR BLD AUTO: 0 % (ref 0–1)
BILIRUB UR QL STRIP: NEGATIVE
BLD GP AB SCN SERPL QL: NEGATIVE
CAOX CRY URNS QL MICRO: ABNORMAL /HPF
CLARITY UR: ABNORMAL
COLOR UR: ABNORMAL
EOSINOPHIL # BLD AUTO: 0.13 THOUSAND/ΜL (ref 0–0.61)
EOSINOPHIL NFR BLD AUTO: 1 % (ref 0–6)
ERYTHROCYTE [DISTWIDTH] IN BLOOD BY AUTOMATED COUNT: 11.9 % (ref 11.6–15.1)
EXTERNAL GROUP B STREP ANTIGEN: POSITIVE
GLUCOSE UR STRIP-MCNC: NEGATIVE MG/DL
HCT VFR BLD AUTO: 34.1 % (ref 34.8–46.1)
HGB BLD-MCNC: 11.7 G/DL (ref 11.5–15.4)
HGB UR QL STRIP.AUTO: NEGATIVE
IMM GRANULOCYTES # BLD AUTO: 0.06 THOUSAND/UL (ref 0–0.2)
IMM GRANULOCYTES NFR BLD AUTO: 1 % (ref 0–2)
KETONES UR STRIP-MCNC: ABNORMAL MG/DL
LEUKOCYTE ESTERASE UR QL STRIP: ABNORMAL
LYMPHOCYTES # BLD AUTO: 2.19 THOUSANDS/ΜL (ref 0.6–4.47)
LYMPHOCYTES NFR BLD AUTO: 20 % (ref 14–44)
MCH RBC QN AUTO: 29.6 PG (ref 26.8–34.3)
MCHC RBC AUTO-ENTMCNC: 34.3 G/DL (ref 31.4–37.4)
MCV RBC AUTO: 86 FL (ref 82–98)
MONOCYTES # BLD AUTO: 0.66 THOUSAND/ΜL (ref 0.17–1.22)
MONOCYTES NFR BLD AUTO: 6 % (ref 4–12)
NEUTROPHILS # BLD AUTO: 8.11 THOUSANDS/ΜL (ref 1.85–7.62)
NEUTS SEG NFR BLD AUTO: 72 % (ref 43–75)
NITRITE UR QL STRIP: NEGATIVE
NON-SQ EPI CELLS URNS QL MICRO: ABNORMAL /HPF
NRBC BLD AUTO-RTO: 0 /100 WBCS
PH UR STRIP.AUTO: 6.5 [PH]
PLATELET # BLD AUTO: 402 THOUSANDS/UL (ref 149–390)
PMV BLD AUTO: 9.2 FL (ref 8.9–12.7)
PROT UR STRIP-MCNC: NEGATIVE MG/DL
RBC # BLD AUTO: 3.95 MILLION/UL (ref 3.81–5.12)
RBC #/AREA URNS AUTO: ABNORMAL /HPF
RH BLD: POSITIVE
SP GR UR STRIP.AUTO: 1.02 (ref 1–1.03)
UROBILINOGEN UR QL STRIP.AUTO: 1 E.U./DL
WBC # BLD AUTO: 11.16 THOUSAND/UL (ref 4.31–10.16)
WBC #/AREA URNS AUTO: ABNORMAL /HPF

## 2021-03-22 PROCEDURE — 36415 COLL VENOUS BLD VENIPUNCTURE: CPT

## 2021-03-22 PROCEDURE — 80081 OBSTETRIC PANEL INC HIV TSTG: CPT

## 2021-03-22 PROCEDURE — 87147 CULTURE TYPE IMMUNOLOGIC: CPT

## 2021-03-22 PROCEDURE — 87086 URINE CULTURE/COLONY COUNT: CPT

## 2021-03-22 PROCEDURE — 81001 URINALYSIS AUTO W/SCOPE: CPT

## 2021-03-23 ENCOUNTER — INITIAL PRENATAL (OUTPATIENT)
Dept: OBGYN CLINIC | Facility: CLINIC | Age: 21
End: 2021-03-23

## 2021-03-23 VITALS — DIASTOLIC BLOOD PRESSURE: 64 MMHG | WEIGHT: 191.8 LBS | SYSTOLIC BLOOD PRESSURE: 110 MMHG | BODY MASS INDEX: 35.08 KG/M2

## 2021-03-23 DIAGNOSIS — O99.211 OBESITY AFFECTING PREGNANCY IN FIRST TRIMESTER: ICD-10-CM

## 2021-03-23 DIAGNOSIS — R11.2 NAUSEA AND VOMITING, INTRACTABILITY OF VOMITING NOT SPECIFIED, UNSPECIFIED VOMITING TYPE: ICD-10-CM

## 2021-03-23 DIAGNOSIS — Z34.01 ENCOUNTER FOR SUPERVISION OF NORMAL FIRST PREGNANCY IN FIRST TRIMESTER: Primary | ICD-10-CM

## 2021-03-23 DIAGNOSIS — Z11.3 ROUTINE SCREENING FOR STI (SEXUALLY TRANSMITTED INFECTION): ICD-10-CM

## 2021-03-23 DIAGNOSIS — O21.9 NAUSEA AND VOMITING IN PREGNANCY: ICD-10-CM

## 2021-03-23 PROBLEM — Z32.01 POSITIVE URINE PREGNANCY TEST: Status: RESOLVED | Noted: 2021-02-08 | Resolved: 2021-03-23

## 2021-03-23 PROBLEM — Z30.011 ENCOUNTER FOR INITIAL PRESCRIPTION OF CONTRACEPTIVE PILLS: Status: RESOLVED | Noted: 2020-09-22 | Resolved: 2021-03-23

## 2021-03-23 LAB
BACTERIA UR CULT: ABNORMAL
BACTERIA UR CULT: ABNORMAL
HBV SURFACE AG SER QL: NORMAL
HIV 1+2 AB+HIV1 P24 AG SERPL QL IA: NORMAL
RPR SER QL: NORMAL
RUBV IGG SERPL IA-ACNC: 28.6 IU/ML
SL AMB  POCT GLUCOSE, UA: ABNORMAL
SL AMB POCT URINE PROTEIN: ABNORMAL

## 2021-03-23 PROCEDURE — PNV: Performed by: OBSTETRICS & GYNECOLOGY

## 2021-03-23 PROCEDURE — 87491 CHLMYD TRACH DNA AMP PROBE: CPT | Performed by: OBSTETRICS & GYNECOLOGY

## 2021-03-23 PROCEDURE — G0145 SCR C/V CYTO,THINLAYER,RESCR: HCPCS | Performed by: OBSTETRICS & GYNECOLOGY

## 2021-03-23 RX ORDER — ONDANSETRON 4 MG/1
4 TABLET, ORALLY DISINTEGRATING ORAL EVERY 8 HOURS SCHEDULED
Qty: 18 TABLET | Refills: 0 | Status: SHIPPED | OUTPATIENT
Start: 2021-03-23 | End: 2021-04-22

## 2021-03-23 RX ORDER — METOCLOPRAMIDE 10 MG/1
10 TABLET ORAL EVERY 6 HOURS
Qty: 30 TABLET | Refills: 0 | Status: SHIPPED | OUTPATIENT
Start: 2021-03-23 | End: 2021-04-22

## 2021-03-23 NOTE — PROGRESS NOTES
Problem List Items Addressed This Visit        Digestive    Nausea and vomiting    Relevant Medications    ondansetron (ZOFRAN-ODT) 4 mg disintegrating tablet       Other    Obesity affecting pregnancy in first trimester     BMI 35  Discussed recommendation for weight gain in preg to be 11-20lbs  She reports having lost 20-30lbs in early pregnancy due to nausea/vomiting  This is improving  Still with limited appetite  Encounter for supervision of normal first pregnancy in first trimester - Primary     Dating by US as previously noted  Pap and cultures obtained today  Consult scheduled with [facility] for 4/2/2021  Labs obtained, partially resulted - WNL  Flu shot declined today  Relevant Orders    Liquid-based pap, screening    Chlamydia/GC amplified DNA by PCR    POCT urine dip (Completed)      Other Visit Diagnoses     Routine screening for STI (sexually transmitted infection)        Relevant Orders    Chlamydia/GC amplified DNA by PCR    Nausea and vomiting in pregnancy        Relevant Medications    metoclopramide (REGLAN) 10 mg tablet        Blue folder given

## 2021-03-23 NOTE — ASSESSMENT & PLAN NOTE
Dating by [address] as previously noted  Pap and cultures obtained today  Consult scheduled with [facility] for 4/2/2021  Labs obtained, partially resulted - WNL  Flu shot declined today

## 2021-03-23 NOTE — ASSESSMENT & PLAN NOTE
BMI 35  Discussed recommendation for weight gain in preg to be 11-20lbs  She reports having lost 20-30lbs in early pregnancy due to nausea/vomiting  This is improving  Still with limited appetite  Individual Psychotherapy (PhD/LCSW)    5/4/2020    Site:  [facility]         Therapeutic Intervention: Met with patient.  Outpatient - Supportive psychotherapy 45 min - CPT Code 29519    Chief complaint/reason for encounter: attention deficit, depression and anxiety     Interval history and content of current session: I think Андрей's symptom picture has dramatically shifted towards depression rather than anxiety or ADHD. While both of these problems still exist for him, Андрей spent a significant amount of time in bed, unable to get himself out and go to class. He felt sluggish, had no motivation, and for a week at a time would lay in bed. Not surprisingly, he failed this past semester. In my last note, in March, I was worried about increasing depression, and things went from bad to worse. He will be here until the fall and hopefully can go bact to school. The symptom picture looks like a Major Depressive Episode, moderate. No suicidal ideation reported.     Treatment plan:  · Target symptoms: depression, distractability, anxiety   · Why chosen therapy is appropriate versus another modality: relevant to diagnosis  · Outcome monitoring methods: self-report  · Therapeutic intervention type: insight oriented psychotherapy, supportive psychotherapy    Risk parameters:  Patient reports no suicidal ideation  Patient reports no homicidal ideation  Patient reports no self-injurious behavior  Patient reports no violent behavior    Verbal deficits: None    Patient's response to intervention:  The patient's response to intervention is accepting.    Progress toward goals and other mental status changes:  The patient's progress toward goals is limited.    Diagnosis: Major Depressive Episode, SOTO, ADHD-Inattentive  No diagnosis found.    Plan:  individual psychotherapy and medication management by physician    Return to clinic: as scheduled    Length of Service (minutes): 45

## 2021-03-25 ENCOUNTER — TELEPHONE (OUTPATIENT)
Dept: OBGYN CLINIC | Facility: CLINIC | Age: 21
End: 2021-03-25

## 2021-03-25 DIAGNOSIS — R82.71 GBS BACTERIURIA: Primary | ICD-10-CM

## 2021-03-25 LAB
C TRACH DNA SPEC QL NAA+PROBE: POSITIVE
N GONORRHOEA DNA SPEC QL NAA+PROBE: ABNORMAL

## 2021-03-25 RX ORDER — AMOXICILLIN 500 MG/1
500 TABLET, FILM COATED ORAL 2 TIMES DAILY
Qty: 14 TABLET | Refills: 0 | Status: SHIPPED | OUTPATIENT
Start: 2021-03-25 | End: 2021-04-01

## 2021-03-25 NOTE — TELEPHONE ENCOUNTER
Normal PN 1 labs except very small amount of beta strep in urine  She will get ABX in labor and I sent in rx for amoxicillin to start now

## 2021-03-25 NOTE — TELEPHONE ENCOUNTER
Pt aware of strep b  Will take abic bid x 7   Will also need abic at delivery if she has strep b present

## 2021-03-26 DIAGNOSIS — A74.9 CHLAMYDIA: Primary | ICD-10-CM

## 2021-03-26 RX ORDER — AZITHROMYCIN 500 MG/1
TABLET, FILM COATED ORAL
Qty: 2 TABLET | Refills: 0 | Status: SHIPPED | OUTPATIENT
Start: 2021-03-26 | End: 2021-03-26

## 2021-03-26 NOTE — TELEPHONE ENCOUNTER
----- Message from Yong Clark MD sent at 3/26/2021  1:50 PM EDT -----  Please notify patient of her chlamydia results being positive and Gonorrhea invalid - for which I would recommend treatment for both at this time  Please rxL   Azithromycin 1g PO x 1 dose    - And have come to office for Ceftriaxone  500mg IM x 1 dose  She will need JULI at next prenatal visit  Partner should be treated

## 2021-03-26 NOTE — TELEPHONE ENCOUNTER
Pt informed of results   order placed for azithromycin  Apt sched 3/29 with vanessa for rocephen  Instructions given to pt and partner

## 2021-03-27 LAB
LAB AP GYN PRIMARY INTERPRETATION: NORMAL
Lab: NORMAL
PATH INTERP SPEC-IMP: NORMAL

## 2021-03-29 ENCOUNTER — CLINICAL SUPPORT (OUTPATIENT)
Dept: OBGYN CLINIC | Facility: CLINIC | Age: 21
End: 2021-03-29
Payer: COMMERCIAL

## 2021-03-29 VITALS — BODY MASS INDEX: 35.26 KG/M2 | DIASTOLIC BLOOD PRESSURE: 68 MMHG | SYSTOLIC BLOOD PRESSURE: 122 MMHG | WEIGHT: 192.8 LBS

## 2021-03-29 DIAGNOSIS — A74.9 CHLAMYDIA: Primary | ICD-10-CM

## 2021-03-29 PROCEDURE — 96372 THER/PROPH/DIAG INJ SC/IM: CPT | Performed by: NURSE PRACTITIONER

## 2021-03-29 RX ORDER — CEFTRIAXONE SODIUM 250 MG/1
250 INJECTION, POWDER, FOR SOLUTION INTRAMUSCULAR; INTRAVENOUS ONCE
Status: COMPLETED | OUTPATIENT
Start: 2021-03-29 | End: 2021-03-29

## 2021-03-29 RX ADMIN — CEFTRIAXONE SODIUM 250 MG: 250 INJECTION, POWDER, FOR SOLUTION INTRAMUSCULAR; INTRAVENOUS at 15:21

## 2021-04-22 ENCOUNTER — ROUTINE PRENATAL (OUTPATIENT)
Dept: OBGYN CLINIC | Facility: CLINIC | Age: 21
End: 2021-04-22

## 2021-04-22 VITALS — SYSTOLIC BLOOD PRESSURE: 105 MMHG | DIASTOLIC BLOOD PRESSURE: 62 MMHG | BODY MASS INDEX: 35.89 KG/M2 | WEIGHT: 196.2 LBS

## 2021-04-22 DIAGNOSIS — Z34.02 ENCOUNTER FOR SUPERVISION OF NORMAL FIRST PREGNANCY IN SECOND TRIMESTER: Primary | ICD-10-CM

## 2021-04-22 DIAGNOSIS — A74.9 CHLAMYDIA INFECTION AFFECTING PREGNANCY IN FIRST TRIMESTER: ICD-10-CM

## 2021-04-22 DIAGNOSIS — O98.811 CHLAMYDIA INFECTION AFFECTING PREGNANCY IN FIRST TRIMESTER: ICD-10-CM

## 2021-04-22 DIAGNOSIS — Z11.3 SCREENING FOR STD (SEXUALLY TRANSMITTED DISEASE): ICD-10-CM

## 2021-04-22 DIAGNOSIS — R11.2 NON-INTRACTABLE VOMITING WITH NAUSEA, UNSPECIFIED VOMITING TYPE: ICD-10-CM

## 2021-04-22 LAB
SL AMB  POCT GLUCOSE, UA: ABNORMAL
SL AMB POCT URINE PROTEIN: ABNORMAL

## 2021-04-22 PROCEDURE — PNV: Performed by: OBSTETRICS & GYNECOLOGY

## 2021-04-22 PROCEDURE — 87491 CHLMYD TRACH DNA AMP PROBE: CPT | Performed by: OBSTETRICS & GYNECOLOGY

## 2021-04-22 PROCEDURE — 87591 N.GONORRHOEAE DNA AMP PROB: CPT | Performed by: OBSTETRICS & GYNECOLOGY

## 2021-04-22 NOTE — PROGRESS NOTES
Patient presents for a routine prenatal visit  16W2D  No LOF,bleeding, cramping or discharge  Hasn't felt any flutters yet  No current complaints at this time  Repeat GC done today     PN1 Labs complete   Level II is scheduled

## 2021-04-22 NOTE — PROGRESS NOTES
Cat Yu is doing well  Level II scheduled  Discussed quickening on average 19wks  Problem List Items Addressed This Visit        Digestive    Nausea and vomiting     Resolved, appetite has improved  Other    Encounter for supervision of normal first pregnancy in first trimester    Relevant Orders    POCT urine dip (Completed)    Chlamydia infection affecting pregnancy in first trimester     S/P treatment  JULI collected today  Recommend repeat testing 36wks when GBS collected              Other Visit Diagnoses     Screening for STD (sexually transmitted disease)    -  Primary    Relevant Orders    Chlamydia/GC amplified DNA by PCR

## 2021-04-27 LAB
C TRACH DNA SPEC QL NAA+PROBE: NEGATIVE
N GONORRHOEA DNA SPEC QL NAA+PROBE: NEGATIVE

## 2021-05-20 ENCOUNTER — ROUTINE PRENATAL (OUTPATIENT)
Dept: PERINATAL CARE | Facility: CLINIC | Age: 21
End: 2021-05-20
Payer: COMMERCIAL

## 2021-05-20 ENCOUNTER — ROUTINE PRENATAL (OUTPATIENT)
Dept: OBGYN CLINIC | Facility: CLINIC | Age: 21
End: 2021-05-20

## 2021-05-20 VITALS
HEIGHT: 62 IN | WEIGHT: 205 LBS | SYSTOLIC BLOOD PRESSURE: 116 MMHG | DIASTOLIC BLOOD PRESSURE: 61 MMHG | BODY MASS INDEX: 37.73 KG/M2 | HEART RATE: 117 BPM

## 2021-05-20 VITALS — SYSTOLIC BLOOD PRESSURE: 118 MMHG | DIASTOLIC BLOOD PRESSURE: 62 MMHG | BODY MASS INDEX: 37.39 KG/M2 | WEIGHT: 204.4 LBS

## 2021-05-20 DIAGNOSIS — Z36.86 ENCOUNTER FOR ANTENATAL SCREENING FOR CERVICAL LENGTH: ICD-10-CM

## 2021-05-20 DIAGNOSIS — O99.211 OBESITY AFFECTING PREGNANCY IN FIRST TRIMESTER: Primary | ICD-10-CM

## 2021-05-20 DIAGNOSIS — O99.212 OBESITY AFFECTING PREGNANCY IN SECOND TRIMESTER: ICD-10-CM

## 2021-05-20 DIAGNOSIS — Z34.01 ENCOUNTER FOR SUPERVISION OF NORMAL FIRST PREGNANCY IN FIRST TRIMESTER: ICD-10-CM

## 2021-05-20 DIAGNOSIS — Z3A.20 20 WEEKS GESTATION OF PREGNANCY: ICD-10-CM

## 2021-05-20 DIAGNOSIS — Z34.02 ENCOUNTER FOR SUPERVISION OF NORMAL FIRST PREGNANCY IN SECOND TRIMESTER: Primary | ICD-10-CM

## 2021-05-20 LAB
SL AMB  POCT GLUCOSE, UA: NORMAL
SL AMB POCT URINE PROTEIN: NORMAL

## 2021-05-20 PROCEDURE — 76817 TRANSVAGINAL US OBSTETRIC: CPT | Performed by: OBSTETRICS & GYNECOLOGY

## 2021-05-20 PROCEDURE — PNV: Performed by: OBSTETRICS & GYNECOLOGY

## 2021-05-20 PROCEDURE — 99242 OFF/OP CONSLTJ NEW/EST SF 20: CPT | Performed by: OBSTETRICS & GYNECOLOGY

## 2021-05-20 PROCEDURE — 76811 OB US DETAILED SNGL FETUS: CPT | Performed by: OBSTETRICS & GYNECOLOGY

## 2021-05-20 RX ORDER — ASPIRIN 81 MG/1
162 TABLET ORAL DAILY
Qty: 180 TABLET | Refills: 3 | Status: SHIPPED | OUTPATIENT
Start: 2021-05-20 | End: 2021-09-18 | Stop reason: HOSPADM

## 2021-05-20 NOTE — PATIENT INSTRUCTIONS
For a COVID-19 Vaccine in Pregnancy Decision Aid, go to https://foamcast org/COVIDvacPregnancy/    The ABM (Academy of Breastfeeding Medicine) Statement on Considerations for COVID-19 Vaccination in Lactation is available at: TravelLesson tn  Finally, the CDC statement on Vaccination Consideration for People who are Pregnant or Breastfeeding is available at:  Rubens ashraf      Here are the images (12/28/20) for the decision aid:

## 2021-06-08 ENCOUNTER — ULTRASOUND (OUTPATIENT)
Dept: PERINATAL CARE | Facility: CLINIC | Age: 21
End: 2021-06-08
Payer: COMMERCIAL

## 2021-06-08 VITALS
SYSTOLIC BLOOD PRESSURE: 137 MMHG | DIASTOLIC BLOOD PRESSURE: 69 MMHG | HEIGHT: 62 IN | HEART RATE: 111 BPM | BODY MASS INDEX: 38.87 KG/M2 | WEIGHT: 211.2 LBS

## 2021-06-08 DIAGNOSIS — Z36.2 ENCOUNTER FOR FOLLOW-UP ULTRASOUND OF FETAL ANATOMY: Primary | ICD-10-CM

## 2021-06-08 PROCEDURE — 76816 OB US FOLLOW-UP PER FETUS: CPT | Performed by: OBSTETRICS & GYNECOLOGY

## 2021-06-08 NOTE — PROGRESS NOTES
[address]: Ms Mireille Taylor was seen today at 23w0d for followup missed anatomy ultrasound  See ultrasound report under "OB Procedures" tab  Please don't hesitate to contact our office with any concerns or questions    Singh Mistry MD

## 2021-06-08 NOTE — PATIENT INSTRUCTIONS
Thank you for choosing us for your  care today  If you have any questions about your ultrasound or care, please do not hesitate to contact us or your primary obstetrician  Some general instructions for your pregnancy are:     Protect against coronavirus: Continue to practice social distancing, wear a mask, and wash your hands often  Pregnant women are increased risk of severe COVID  Notify your primary care doctor if you have any symptoms including cough, shortness of breath or difficulty breathing, fever, chills, muscle pain, sore throat, or loss of taste or smell  Pregnant women can receive the coronavirus vaccine   Exercise: Aim for 22 minutes per day (150 minutes per week) of regular exercise  Walking is great!  Nutrition: aim for calcium-rich and iron-rich foods as well as healthy sources of protein   Protect against the flu: get yourself and your entire household vaccinated against influenza  This will protect your baby   Learn about Preeclampsia: preeclampsia is a common, serious high blood pressure complication in pregnancy  A blood pressure of 486WQZA (systolic or top number) or 23CNFK (diastolic or bottom number) is not normal and needs evaluation by your doctor   If you smoke, try to reduce how many cigarettes you smoke or try to quit completely  Do not vape   Other warning signs to watch out for in pregnancy or postpartum: chest pain, obstructed breathing or shortness of breath, seizures, thoughts of hurting yourself or your baby, bleeding, a painful or swollen leg, fever, or headache (see AWHONN POST-BIRTH Warning Signs campaign)  If these happen call 911  Itching is also not normal in pregnancy and if you experience this, especially over your hands and feet, potentially worse at night, notify your doctors     Lastly, if you are contacted regarding participation in a survey about your experience in our office, please know that we take any feedback you provide seriously and use it to improve how we deliver care through our center

## 2021-06-17 ENCOUNTER — ROUTINE PRENATAL (OUTPATIENT)
Dept: OBGYN CLINIC | Facility: CLINIC | Age: 21
End: 2021-06-17

## 2021-06-17 VITALS — WEIGHT: 213.8 LBS | DIASTOLIC BLOOD PRESSURE: 60 MMHG | SYSTOLIC BLOOD PRESSURE: 118 MMHG | BODY MASS INDEX: 39.1 KG/M2

## 2021-06-17 DIAGNOSIS — Z34.02 ENCOUNTER FOR SUPERVISION OF NORMAL FIRST PREGNANCY IN SECOND TRIMESTER: Primary | ICD-10-CM

## 2021-06-17 LAB
SL AMB  POCT GLUCOSE, UA: NORMAL
SL AMB POCT URINE PROTEIN: NORMAL

## 2021-06-17 PROCEDURE — PNV: Performed by: OBSTETRICS & GYNECOLOGY

## 2021-06-17 NOTE — PROGRESS NOTES
James Franco is overall doing well  TWG # -6lbs, but improving - did lose significant weight in the beginning  Will have follow up ultrasound in third trimester due to obesity/missed anatomy  28wk lab slip provided  A positive  Baby is active  No bleeding/LOF/cramping

## 2021-06-17 NOTE — PROGRESS NOTES
Patient presents for a routine prenatal visit  24W2D  GFM  No LOF, VB or CTX  No current complaints at this time  28wk Lab slip given at today's visit

## 2021-07-14 ENCOUNTER — APPOINTMENT (OUTPATIENT)
Dept: LAB | Age: 21
End: 2021-07-14
Payer: COMMERCIAL

## 2021-07-14 DIAGNOSIS — Z34.02 ENCOUNTER FOR SUPERVISION OF NORMAL FIRST PREGNANCY IN SECOND TRIMESTER: ICD-10-CM

## 2021-07-14 LAB
BASOPHILS # BLD MANUAL: 0 THOUSAND/UL (ref 0–0.1)
BASOPHILS NFR MAR MANUAL: 0 % (ref 0–1)
EOSINOPHIL # BLD MANUAL: 0.14 THOUSAND/UL (ref 0–0.4)
EOSINOPHIL NFR BLD MANUAL: 1 % (ref 0–6)
ERYTHROCYTE [DISTWIDTH] IN BLOOD BY AUTOMATED COUNT: 12.1 % (ref 11.6–15.1)
GLUCOSE 1H P 50 G GLC PO SERPL-MCNC: 122 MG/DL (ref 40–134)
HCT VFR BLD AUTO: 28 % (ref 34.8–46.1)
HGB BLD-MCNC: 9.8 G/DL (ref 11.5–15.4)
LYMPHOCYTES # BLD AUTO: 1.09 THOUSAND/UL (ref 0.6–4.47)
LYMPHOCYTES # BLD AUTO: 8 % (ref 14–44)
MCH RBC QN AUTO: 31 PG (ref 26.8–34.3)
MCHC RBC AUTO-ENTMCNC: 35 G/DL (ref 31.4–37.4)
MCV RBC AUTO: 89 FL (ref 82–98)
MONOCYTES # BLD AUTO: 0.68 THOUSAND/UL (ref 0–1.22)
MONOCYTES NFR BLD: 5 % (ref 4–12)
NEUTROPHILS # BLD MANUAL: 11.3 THOUSAND/UL (ref 1.85–7.62)
NEUTS BAND NFR BLD MANUAL: 11 % (ref 0–8)
NEUTS SEG NFR BLD AUTO: 72 % (ref 43–75)
NRBC BLD AUTO-RTO: 0 /100 WBCS
PLATELET # BLD AUTO: 383 THOUSANDS/UL (ref 149–390)
PLATELET BLD QL SMEAR: ADEQUATE
PMV BLD AUTO: 9.5 FL (ref 8.9–12.7)
POLYCHROMASIA BLD QL SMEAR: PRESENT
RBC # BLD AUTO: 3.16 MILLION/UL (ref 3.81–5.12)
RBC MORPH BLD: PRESENT
SMUDGE CELLS BLD QL SMEAR: PRESENT
VARIANT LYMPHS # BLD AUTO: 3 %
WBC # BLD AUTO: 13.62 THOUSAND/UL (ref 4.31–10.16)

## 2021-07-14 PROCEDURE — 85007 BL SMEAR W/DIFF WBC COUNT: CPT

## 2021-07-14 PROCEDURE — 82950 GLUCOSE TEST: CPT

## 2021-07-14 PROCEDURE — 86592 SYPHILIS TEST NON-TREP QUAL: CPT

## 2021-07-14 PROCEDURE — 36415 COLL VENOUS BLD VENIPUNCTURE: CPT

## 2021-07-14 PROCEDURE — 85027 COMPLETE CBC AUTOMATED: CPT

## 2021-07-15 ENCOUNTER — TELEPHONE (OUTPATIENT)
Dept: OBGYN CLINIC | Facility: CLINIC | Age: 21
End: 2021-07-15

## 2021-07-15 DIAGNOSIS — O99.013 ANEMIA DURING PREGNANCY IN THIRD TRIMESTER: Primary | ICD-10-CM

## 2021-07-15 LAB — RPR SER QL: NORMAL

## 2021-07-15 NOTE — TELEPHONE ENCOUNTER
----- Message from Olimpia Mendoza MD sent at 7/15/2021 11:31 AM EDT -----  Labs reviewed  Passed 1 hour glucola  CBC with anemia - please order OB anemia panel, and hemoglobin electrophoresis  She should add iron supplementation

## 2021-07-15 NOTE — TELEPHONE ENCOUNTER
L/M for patient to call back  Orders placed for labs  Please instruct patient to add iron supplementation as well

## 2021-07-15 NOTE — TELEPHONE ENCOUNTER
----- Message from Polly Merino MD sent at 7/15/2021 11:31 AM EDT -----  Labs reviewed  Passed 1 hour glucola  CBC with anemia - please order OB anemia panel, and hemoglobin electrophoresis  She should add iron supplementation

## 2021-07-29 ENCOUNTER — ROUTINE PRENATAL (OUTPATIENT)
Dept: OBGYN CLINIC | Facility: CLINIC | Age: 21
End: 2021-07-29

## 2021-07-29 VITALS — DIASTOLIC BLOOD PRESSURE: 72 MMHG | BODY MASS INDEX: 41.19 KG/M2 | SYSTOLIC BLOOD PRESSURE: 122 MMHG | WEIGHT: 225.2 LBS

## 2021-07-29 DIAGNOSIS — Z34.03 ENCOUNTER FOR SUPERVISION OF NORMAL FIRST PREGNANCY IN THIRD TRIMESTER: Primary | ICD-10-CM

## 2021-07-29 PROCEDURE — PNV: Performed by: OBSTETRICS & GYNECOLOGY

## 2021-07-29 RX ORDER — FERROUS SULFATE 325(65) MG
325 TABLET ORAL
COMMUNITY
End: 2022-03-03

## 2021-07-29 NOTE — PROGRESS NOTES
Has not been seen since 6/17 (24 2)  28 wk labs done  1hr GTT= 122  Started taking Iron- Advised Vit C or Orange juice   Yellow Folder Given today  Delivery Consent Signed  Denies LOF, VB or CTX  GFM!

## 2021-08-11 ENCOUNTER — ULTRASOUND (OUTPATIENT)
Dept: PERINATAL CARE | Facility: CLINIC | Age: 21
End: 2021-08-11
Payer: COMMERCIAL

## 2021-08-11 VITALS
SYSTOLIC BLOOD PRESSURE: 127 MMHG | WEIGHT: 230.8 LBS | HEIGHT: 62 IN | BODY MASS INDEX: 42.47 KG/M2 | HEART RATE: 120 BPM | DIASTOLIC BLOOD PRESSURE: 58 MMHG

## 2021-08-11 DIAGNOSIS — Z3A.32 32 WEEKS GESTATION OF PREGNANCY: ICD-10-CM

## 2021-08-11 DIAGNOSIS — O99.213 OBESITY AFFECTING PREGNANCY IN THIRD TRIMESTER: Primary | ICD-10-CM

## 2021-08-11 PROCEDURE — 76816 OB US FOLLOW-UP PER FETUS: CPT | Performed by: OBSTETRICS & GYNECOLOGY

## 2021-08-11 NOTE — LETTER
August 12, 2021     Yael Kimball, [address] [facility]  [address] [address]    Patient: Austen Cameron   YOB: 2000   Date of Visit: 8/11/2021       Dear Dr Tico Zepeda: Thank you for referring Austen Cameron to me for evaluation  Below are my notes for this consultation  If you have questions, please do not hesitate to call me  I look forward to following your patient along with you  Sincerely,        Javier Reynolds MD        CC: No Recipients  Javier Reynolds MD  8/12/2021 12:53 AM  Sign when Signing Visit  Austen Cameron  is here today at [place] [address] for an ultrasound for  Fetal growth and review the prior missed anatomy of the ductal arch and nose and lips  Risk factors include her elevated BMI > 40  She is seeing her OB provider tomorrow  Ultrasound findings: The ultrasound today shows normal interval fetal growth and fluid  The ductus was seen today and appears normal   Views of the lips  continue to be limited secondary to fetal position  Pregnancy ultrasound has limitations and is unable to detect all forms of fetal congenital abnormalities  The inaccuracy in the EFW can be off by 1 lb either way in the third trimester  Follow up recommended:   No further follow-up ultrasounds for growth are recommended at this time  Recommend her OB office offer weekly NST/fluid scans from 36 weeks on secondary to her elevated BMI        Javier Reynolds MD

## 2021-08-12 ENCOUNTER — ROUTINE PRENATAL (OUTPATIENT)
Dept: OBGYN CLINIC | Facility: CLINIC | Age: 21
End: 2021-08-12

## 2021-08-12 VITALS — SYSTOLIC BLOOD PRESSURE: 118 MMHG | WEIGHT: 232.6 LBS | BODY MASS INDEX: 42.54 KG/M2 | DIASTOLIC BLOOD PRESSURE: 62 MMHG

## 2021-08-12 DIAGNOSIS — O99.213 OBESITY AFFECTING PREGNANCY IN THIRD TRIMESTER: ICD-10-CM

## 2021-08-12 DIAGNOSIS — R82.71 GBS BACTERIURIA: ICD-10-CM

## 2021-08-12 DIAGNOSIS — Z34.03 ENCOUNTER FOR SUPERVISION OF NORMAL FIRST PREGNANCY IN THIRD TRIMESTER: Primary | ICD-10-CM

## 2021-08-12 LAB
SL AMB  POCT GLUCOSE, UA: NORMAL
SL AMB POCT URINE PROTEIN: NORMAL

## 2021-08-12 PROCEDURE — PNV: Performed by: OBSTETRICS & GYNECOLOGY

## 2021-08-12 NOTE — PROGRESS NOTES
Patient presents for a routine prenatal visit  32W2D  +GFM  No LOF, VB or CTX  No current complaints at this time

## 2021-08-12 NOTE — PROGRESS NOTES
Austen Cameron  is here today at [place] [address] for an ultrasound for  Fetal growth and review the prior missed anatomy of the ductal arch and nose and lips  Risk factors include her elevated BMI > 40  She is seeing her OB provider tomorrow  Ultrasound findings: The ultrasound today shows normal interval fetal growth and fluid  The ductus was seen today and appears normal   Views of the lips  continue to be limited secondary to fetal position  Pregnancy ultrasound has limitations and is unable to detect all forms of fetal congenital abnormalities  The inaccuracy in the EFW can be off by 1 lb either way in the third trimester  Follow up recommended:   No further follow-up ultrasounds for growth are recommended at this time  Recommend her OB office offer weekly NST/fluid scans from 36 weeks on secondary to her elevated BMI        Javier Reynolds MD

## 2021-08-12 NOTE — ASSESSMENT & PLAN NOTE
Recommended for weekly NST/AILYN from 36wks on  Growth at 32 1- 81%, no futher US scheduled at this time

## 2021-08-12 NOTE — PROGRESS NOTES
Joy Sayres is doing well  Will try and schedule visit with a few other docs, would like to meet more providers  Will need repeat GC/Chlamydia testing at 36 weeks  Problem List Items Addressed This Visit        Other    Obesity affecting pregnancy in third trimester     Recommended for weekly NST/AILYN from 36wks on  Growth at 32 1- 81%, no futher US scheduled at this time            GBS bacteriuria      Other Visit Diagnoses     Encounter for supervision of normal first pregnancy in third trimester    -  Primary    Relevant Orders    POCT urine dip (Completed)

## 2021-08-25 ENCOUNTER — ROUTINE PRENATAL (OUTPATIENT)
Dept: OBGYN CLINIC | Facility: CLINIC | Age: 21
End: 2021-08-25

## 2021-08-25 VITALS — SYSTOLIC BLOOD PRESSURE: 120 MMHG | BODY MASS INDEX: 42.95 KG/M2 | WEIGHT: 234.8 LBS | DIASTOLIC BLOOD PRESSURE: 72 MMHG

## 2021-08-25 DIAGNOSIS — Z34.83 ENCOUNTER FOR SUPERVISION OF OTHER NORMAL PREGNANCY IN THIRD TRIMESTER: Primary | ICD-10-CM

## 2021-08-25 DIAGNOSIS — Z34.03 ENCOUNTER FOR SUPERVISION OF NORMAL FIRST PREGNANCY IN THIRD TRIMESTER: ICD-10-CM

## 2021-08-25 PROBLEM — R11.2 NAUSEA AND VOMITING: Status: RESOLVED | Noted: 2021-02-08 | Resolved: 2021-08-25

## 2021-08-25 LAB
SL AMB  POCT GLUCOSE, UA: NEGATIVE
SL AMB POCT URINE PROTEIN: NEGATIVE

## 2021-08-25 PROCEDURE — PNV: Performed by: STUDENT IN AN ORGANIZED HEALTH CARE EDUCATION/TRAINING PROGRAM

## 2021-08-25 NOTE — PROGRESS NOTES
Pt is here for routine ob visit   No concerns at this time  Urine neg/neg   No LOF,VB,Contractions  +FM   Delivery consent signed

## 2021-08-25 NOTE — PROGRESS NOTES
24 y o   at 34w1d, here for return OB visit  Feeling well overall and without concerns  Good FM  Denies LOF, VB, contractions  Denies dysuria, hematuria  No problems with activity  Walking when she can  No questions/concerns       Problem List        Other    Vitamin D deficiency    GBS bacteriuria    Obesity affecting pregnancy in third trimester    Encounter for supervision of normal first pregnancy in third trimester    Overview     Declines vaccines in pregnancy  -prepregnancy BMI 40 with goal weight gain 11-20#           Current Assessment & Plan     -precautions reviewed  -TWG = 14#, recommended walking 5+ time per week         Chlamydia infection affecting pregnancy in first trimester    Current Assessment & Plan     Retest negative           Other Visit Diagnoses     Encounter for supervision of other normal pregnancy in third trimester    -  Primary

## 2021-09-08 ENCOUNTER — ROUTINE PRENATAL (OUTPATIENT)
Dept: OBGYN CLINIC | Facility: CLINIC | Age: 21
End: 2021-09-08
Payer: COMMERCIAL

## 2021-09-08 VITALS — DIASTOLIC BLOOD PRESSURE: 76 MMHG | SYSTOLIC BLOOD PRESSURE: 122 MMHG | BODY MASS INDEX: 43.35 KG/M2 | WEIGHT: 237 LBS

## 2021-09-08 DIAGNOSIS — O98.811 CHLAMYDIA INFECTION AFFECTING PREGNANCY IN FIRST TRIMESTER: ICD-10-CM

## 2021-09-08 DIAGNOSIS — A74.9 CHLAMYDIA INFECTION AFFECTING PREGNANCY IN FIRST TRIMESTER: ICD-10-CM

## 2021-09-08 DIAGNOSIS — O99.213 OBESITY AFFECTING PREGNANCY IN THIRD TRIMESTER: ICD-10-CM

## 2021-09-08 DIAGNOSIS — R82.71 GBS BACTERIURIA: ICD-10-CM

## 2021-09-08 DIAGNOSIS — Z34.03 ENCOUNTER FOR SUPERVISION OF NORMAL FIRST PREGNANCY IN THIRD TRIMESTER: Primary | ICD-10-CM

## 2021-09-08 DIAGNOSIS — Z11.3 SCREENING FOR STD (SEXUALLY TRANSMITTED DISEASE): ICD-10-CM

## 2021-09-08 LAB
SL AMB  POCT GLUCOSE, UA: ABNORMAL
SL AMB POCT URINE PROTEIN: ABNORMAL

## 2021-09-08 PROCEDURE — 87491 CHLMYD TRACH DNA AMP PROBE: CPT | Performed by: STUDENT IN AN ORGANIZED HEALTH CARE EDUCATION/TRAINING PROGRAM

## 2021-09-08 PROCEDURE — PNV: Performed by: STUDENT IN AN ORGANIZED HEALTH CARE EDUCATION/TRAINING PROGRAM

## 2021-09-08 PROCEDURE — 87591 N.GONORRHOEAE DNA AMP PROB: CPT | Performed by: STUDENT IN AN ORGANIZED HEALTH CARE EDUCATION/TRAINING PROGRAM

## 2021-09-08 PROCEDURE — 59025 FETAL NON-STRESS TEST: CPT | Performed by: STUDENT IN AN ORGANIZED HEALTH CARE EDUCATION/TRAINING PROGRAM

## 2021-09-08 NOTE — PROGRESS NOTES
Problem List Items Addressed This Visit        Other    Obesity affecting pregnancy in third trimester     Pregravid BMI 43, goal for pregnancy 11-20  TWG to date 17#  For weekly NST AILYN  8/11/2021 EFW [place] 4   2185 grams - 4 lbs 13 oz                 (81%)         Encounter for supervision of normal first pregnancy in third trimester - Primary     24 y o  Kervin Muro at 36w1d presents today for routine PNV  Denies leakage of fluid, vaginal bleeding, contractions    Good fetal movement   -precautions reviewed  -NST performed today, reactive  Baseline 120 bpm / moderate variability / + accelerations, no decelerations  DVP 3 78 cm  -SVE today closed/long/high    Plan to schedule for weekly NST AILYN         Relevant Orders    POCT urine dip (Completed)    GBS bacteriuria     For abx in labor         Chlamydia infection affecting pregnancy in first trimester     Repeat screen collected today  Test of cure negative           Other Visit Diagnoses     Screening for STD (sexually transmitted disease)        Relevant Orders    Chlamydia/GC amplified DNA by PCR

## 2021-09-08 NOTE — PROGRESS NOTES
Pt is here for routine PN visit  GBS pos  GC/CH collected today  Denies any VB, cramping or LOF  Only concern is when having an active day and bends over, notices some numbness/tingling in upper belly

## 2021-09-08 NOTE — ASSESSMENT & PLAN NOTE
Pregravid BMI 43, goal for pregnancy 11-20   TWG to date 17#  For weekly NST AILYN  8/11/2021 EFW Hadlock 4   2185 grams - 4 lbs 13 oz                 (81%)

## 2021-09-08 NOTE — ASSESSMENT & PLAN NOTE
24 y o  Janyth Cowden at 36w1d presents today for routine PNV  Denies leakage of fluid, vaginal bleeding, contractions    Good fetal movement   -precautions reviewed  -NST performed today, reactive  Baseline 120 bpm / moderate variability / + accelerations, no decelerations  DVP 3 78 cm  -SVE today closed/long/high    Plan to schedule for weekly NST AILYN

## 2021-09-10 LAB
C TRACH DNA SPEC QL NAA+PROBE: NEGATIVE
N GONORRHOEA DNA SPEC QL NAA+PROBE: NEGATIVE

## 2021-09-15 ENCOUNTER — ROUTINE PRENATAL (OUTPATIENT)
Dept: OBGYN CLINIC | Facility: CLINIC | Age: 21
End: 2021-09-15

## 2021-09-15 ENCOUNTER — HOSPITAL ENCOUNTER (INPATIENT)
Facility: HOSPITAL | Age: 21
LOS: 3 days | Discharge: HOME | End: 2021-09-18
Attending: STUDENT IN AN ORGANIZED HEALTH CARE EDUCATION/TRAINING PROGRAM | Admitting: STUDENT IN AN ORGANIZED HEALTH CARE EDUCATION/TRAINING PROGRAM
Payer: COMMERCIAL

## 2021-09-15 VITALS — SYSTOLIC BLOOD PRESSURE: 120 MMHG | WEIGHT: 243 LBS | DIASTOLIC BLOOD PRESSURE: 74 MMHG | BODY MASS INDEX: 44.45 KG/M2

## 2021-09-15 DIAGNOSIS — R82.71 GBS BACTERIURIA: ICD-10-CM

## 2021-09-15 DIAGNOSIS — Z30.011 ENCOUNTER FOR INITIAL PRESCRIPTION OF CONTRACEPTIVE PILLS: ICD-10-CM

## 2021-09-15 DIAGNOSIS — Z3A.37 37 WEEKS GESTATION OF PREGNANCY: ICD-10-CM

## 2021-09-15 DIAGNOSIS — Z34.83 ENCOUNTER FOR SUPERVISION OF OTHER NORMAL PREGNANCY IN THIRD TRIMESTER: Primary | ICD-10-CM

## 2021-09-15 DIAGNOSIS — Z34.03 ENCOUNTER FOR SUPERVISION OF NORMAL FIRST PREGNANCY IN THIRD TRIMESTER: ICD-10-CM

## 2021-09-15 DIAGNOSIS — O99.213 OBESITY AFFECTING PREGNANCY IN THIRD TRIMESTER: ICD-10-CM

## 2021-09-15 LAB
ABO GROUP BLD: NORMAL
AMPHETAMINES SERPL QL SCN: NEGATIVE
BARBITURATES UR QL: NEGATIVE
BENZODIAZ UR QL: NEGATIVE
BLD GP AB SCN SERPL QL: NEGATIVE
COCAINE UR QL: NEGATIVE
ERYTHROCYTE [DISTWIDTH] IN BLOOD BY AUTOMATED COUNT: 14.6 % (ref 11.6–15.1)
HCT VFR BLD AUTO: 29.2 % (ref 34.8–46.1)
HGB BLD-MCNC: 9.4 G/DL (ref 11.5–15.4)
MCH RBC QN AUTO: 26.6 PG (ref 26.8–34.3)
MCHC RBC AUTO-ENTMCNC: 32.2 G/DL (ref 31.4–37.4)
MCV RBC AUTO: 83 FL (ref 82–98)
METHADONE UR QL: NEGATIVE
OPIATES UR QL SCN: NEGATIVE
OXYCODONE+OXYMORPHONE UR QL SCN: NEGATIVE
PCP UR QL: NEGATIVE
PLATELET # BLD AUTO: 352 THOUSANDS/UL (ref 149–390)
PMV BLD AUTO: 9.5 FL (ref 8.9–12.7)
RBC # BLD AUTO: 3.54 MILLION/UL (ref 3.81–5.12)
RH BLD: POSITIVE
SPECIMEN EXPIRATION DATE: NORMAL
THC UR QL: POSITIVE
WBC # BLD AUTO: 12.29 THOUSAND/UL (ref 4.31–10.16)

## 2021-09-15 PROCEDURE — 86850 RBC ANTIBODY SCREEN: CPT

## 2021-09-15 PROCEDURE — PNV: Performed by: STUDENT IN AN ORGANIZED HEALTH CARE EDUCATION/TRAINING PROGRAM

## 2021-09-15 PROCEDURE — 86901 BLOOD TYPING SEROLOGIC RH(D): CPT

## 2021-09-15 PROCEDURE — 86900 BLOOD TYPING SEROLOGIC ABO: CPT

## 2021-09-15 PROCEDURE — 85027 COMPLETE CBC AUTOMATED: CPT

## 2021-09-15 PROCEDURE — NC001 PR NO CHARGE: Performed by: STUDENT IN AN ORGANIZED HEALTH CARE EDUCATION/TRAINING PROGRAM

## 2021-09-15 PROCEDURE — 99214 OFFICE O/P EST MOD 30 MIN: CPT

## 2021-09-15 PROCEDURE — 86592 SYPHILIS TEST NON-TREP QUAL: CPT

## 2021-09-15 PROCEDURE — 76815 OB US LIMITED FETUS(S): CPT

## 2021-09-15 PROCEDURE — 80307 DRUG TEST PRSMV CHEM ANLYZR: CPT

## 2021-09-15 RX ORDER — SODIUM CHLORIDE, SODIUM LACTATE, POTASSIUM CHLORIDE, CALCIUM CHLORIDE 600; 310; 30; 20 MG/100ML; MG/100ML; MG/100ML; MG/100ML
125 INJECTION, SOLUTION INTRAVENOUS CONTINUOUS
Status: DISCONTINUED | OUTPATIENT
Start: 2021-09-15 | End: 2021-09-18 | Stop reason: HOSPADM

## 2021-09-15 RX ORDER — ONDANSETRON 2 MG/ML
4 INJECTION INTRAMUSCULAR; INTRAVENOUS EVERY 6 HOURS PRN
Status: DISCONTINUED | OUTPATIENT
Start: 2021-09-15 | End: 2021-09-16

## 2021-09-15 RX ADMIN — MISOPROSTOL 25 MCG: 100 TABLET ORAL at 18:47

## 2021-09-15 NOTE — PROGRESS NOTES
Mena Phillips [age] y o  female MRN: [id number]  Unit/Bed#: LD TRIAGE 4-01 Encounter: [id number]      Assessment: 24 y o  Gabriel Mcgregor at 42w4d presenting to triage for monitoring duet to AILYN < 5 cm  on U/S at her routine prenatal visit  Per SLOGA:  AILYN without 2x2 pocket of fluid  One pocket witih 3cm of fluid, less than 2cm in width  Total of <5cm fluid  -no formal BPP performed, but fetal breathing movement noted on ultrasound        FHT: Category 1  Vertex confirmed by U/S  GBS status: positive as of 11w6d  Postpartum contraception plan: none      Plan:   · Monitor FHT   · Perform U/S & AILYN  · Encourage hydration    Dr Quoc Harmon aware        SUBJECTIVE:    Chief Complaint: sent over from [facility] for AILYN < 5 cm    HPI: Rhae Lombard is a 24 y o  Gabriel Mcgregor with an VALENTÍN of 10/5/2021, by Ultrasound at 37w1d who is being monitored for extended fetal monitoring secondary to AILYN < 5cm at her routine prenatal visit  Denies leakage of fluid, vaginal bleeding, and contraction  Good fetal movement  Pregnancy complications:   Obesity affecting pregnancy, pre-pregnancy BMI of 40 with 23 pounds gained  GBS bacteriuria    Chlamydia: Negative on 9/8/2021    Patient Active Problem List   Diagnosis    Vitamin D deficiency    Obesity affecting pregnancy in third trimester    Encounter for supervision of normal first pregnancy in third trimester    GBS bacteriuria    Chlamydia infection affecting pregnancy in first trimester       Baby complications/comments: none    Review of Systems   Constitutional: Negative for chills, fatigue and fever  Eyes: Negative for visual disturbance  Respiratory: Negative for cough, chest tightness and shortness of breath  Gastrointestinal: Negative for abdominal pain  Genitourinary: Negative for vaginal bleeding and vaginal discharge  Neurological: Negative for dizziness, seizures, speech difficulty and headaches     Psychiatric/Behavioral: Negative for agitation and confusion  OB History    Para Term  AB Living   1 0 0 0 0 0   SAB TAB Ectopic Multiple Live Births   0 0 0 0 0      # Outcome Date GA Lbr Juan Daniel/2nd Weight Sex Delivery Anes PTL Lv   1 Current                Past Medical History:   Diagnosis Date    Anxiety     Depression     no meds since age 15    Varicella     had vaccines       Past Surgical History:   Procedure Laterality Date    WISDOM TOOTH EXTRACTION      2019       Social History     Tobacco Use    Smoking status: Never Smoker    Smokeless tobacco: Never Used   Substance Use Topics    Alcohol use: No     Comment: none with pregnancy       No Known Allergies    Medications Prior to Admission   Medication    aspirin (ECOTRIN LOW STRENGTH) 81 mg EC tablet    ferrous sulfate 325 (65 Fe) mg tablet    Prenatal Vit-Fe Fumarate-FA (PRENATAL VITAMIN PO)           OBJECTIVE:  Vitals:  Temp:  [98 1 °F (36 7 °C)] 98 1 °F (36 7 °C)  HR:  [86] 86  Resp:  [20] 20  BP: (120)/(71-74) 120/71  Body mass index is 44 45 kg/m²  Physical Exam:  Physical Exam  Constitutional:       Appearance: Normal appearance  She is obese  HENT:      Head: Normocephalic  Cardiovascular:      Rate and Rhythm: Normal rate and regular rhythm  Pulmonary:      Effort: Pulmonary effort is normal       Breath sounds: Normal breath sounds  Abdominal:      General: Abdomen is flat  Palpations: Abdomen is soft  Tenderness: There is no abdominal tenderness  There is no guarding or rebound  Neurological:      Mental Status: She is alert     Psychiatric:         Mood and Affect: Mood normal          Behavior: Behavior normal           SVE:       FHT:  Baseline Rate: 140 bpm  Variability: Moderate 6-25 bpm  Accelerations: 15 x 15 or greater  Decelerations: None  FHR Category: Category I    TOCO:   Contraction Frequency (minutes): irritability  Contraction Quality: Mild    Lab Results   Component Value Date    WBC 13 62 (H) 2021    HGB 9 8 (L) 07/14/2021    HCT 28 0 (L) 07/14/2021     07/14/2021     Lab Results   Component Value Date    K 3 7 02/09/2021     02/09/2021    CO2 25 02/09/2021    BUN 7 02/09/2021    CREATININE 0 59 (L) 02/09/2021    AST 12 02/09/2021    ALT 16 02/09/2021       Prenatal Labs: Reviewed      Blood type: A+  Group B strep: positive  RPR: non-reactive (7/14/21)  Rubella: Immune  1 hour Glucose: 122 (7/14/21)  Platelets: 132 (9/94/64)  Hgb: 9 8 (7/14/21)     [address]  Medical Student  9/15/2021  1:39 PM

## 2021-09-15 NOTE — H&P
Mena Phillips [age] y o  female MRN: [id number]  Unit/Bed#: LD TRIAGE  Encounter: [id number]      Assessment: 24 y o  Leah Ontiveros at 37w1d admitted for oligohydramnios  SVE: cl/th/hi  FHT: Baseline   Clinical EFW: 81%tile ; Vertex confirmed by bedside ultrasound  GBS status: positive        Plan:   · Admit  · CBC, RPR, Type & Screen  · Analgesia at maternal request  · Will plan to begin induction with vaginal Cytotec   · Antibiotics: PCN for GBS ppx    Dr Sj Rossi aware        SUBJECTIVE:    Chief Complaint: "I was sent over from by [facility] office for a repeat AILYN"     HPI: Clyde Adams is a 24 y o  Leah Ontiveros with an VALENTÍN of 10/5/2021, by Ultrasound at 37w1d who is being admitted for induction of labor secondary to oligohydramnios  She complains of uterine contractions irregularly, has  reports leakage of fluid that she thought was a large amount of vaginal discharge yesterday , and reports no VB  She states she has felt good FM  States that she feels well otherwise  Denies any other significant past medical history  Pregnancy complications: chlamydia in third trimester, test of cure 21    Patient Active Problem List   Diagnosis    Vitamin D deficiency    Obesity affecting pregnancy in third trimester    Encounter for supervision of normal first pregnancy in third trimester    GBS bacteriuria    Chlamydia infection affecting pregnancy in first trimester    37 weeks gestation of pregnancy       Baby complications/comments:  EFW 81%tile; AC 82%tile, BPD 92%tile, HC 63%tile, Femur 61%tile     Review of Systems   Constitutional: Negative  HENT: Negative  Eyes: Negative  Respiratory: Negative  Cardiovascular: Negative  Gastrointestinal: Negative  Endocrine: Negative  Genitourinary: Negative  Neurological: Negative  Psychiatric/Behavioral: Negative          OB History    Para Term  AB Living   1 0 0 0 0 0   SAB TAB Ectopic Multiple Live Births   0 0 0 0 0      # Outcome Date GA Lbr Juan Daniel/2nd Weight Sex Delivery Anes PTL Lv   1 Current                Past Medical History:   Diagnosis Date    Anxiety     Depression     no meds since age 15    Varicella     had vaccines       Past Surgical History:   Procedure Laterality Date    WISDOM TOOTH EXTRACTION      1/2019       Social History     Tobacco Use    Smoking status: Never Smoker    Smokeless tobacco: Never Used   Substance Use Topics    Alcohol use: No     Comment: none with pregnancy       No Known Allergies    Medications Prior to Admission   Medication    aspirin (ECOTRIN LOW STRENGTH) 81 mg EC tablet    ferrous sulfate 325 (65 Fe) mg tablet    Prenatal Vit-Fe Fumarate-FA (PRENATAL VITAMIN PO)           OBJECTIVE:  Vitals:  Temp:  [98 1 °F (36 7 °C)] 98 1 °F (36 7 °C)  HR:  [86] 86  Resp:  [20] 20  BP: (120)/(71-74) 120/71  Body mass index is 44 45 kg/m²  Physical Exam:  Physical Exam  Constitutional:       Appearance: Normal appearance  Cardiovascular:      Rate and Rhythm: Normal rate  Pulmonary:      Effort: Pulmonary effort is normal    Abdominal:      Palpations: Abdomen is soft  Comments: Gravid     Neurological:      General: No focal deficit present  Mental Status: She is alert  Skin:     General: Skin is warm and dry     Psychiatric:         Mood and Affect: Mood normal          Behavior: Behavior normal         AILYN:   Q1 2 92 cm   Q2 0 00 cm   Q3 0 00 cm   Q4 1 58 cm   Total: 4 0cm     Largest fluid pocket: 2 4 cm x 0 77 cm                   Ferning: negative  Pooling: negative   Nitrazine: negative     SVE:   cl/th/hi    FHT:  Baseline Rate: 140 bpm  Variability: Moderate 6-25 bpm  Accelerations: 15 x 15 or greater  Decelerations: None  FHR Category: Category I    TOCO:   Contraction Frequency (minutes): irritability  Contraction Quality: Mild    Lab Results   Component Value Date    WBC 13 62 (H) 07/14/2021    HGB 9 8 (L) 07/14/2021    HCT 28 0 (L) 07/14/2021     07/14/2021     Lab Results   Component Value Date    K 3 7 02/09/2021     02/09/2021    CO2 25 02/09/2021    BUN 7 02/09/2021    CREATININE 0 59 (L) 02/09/2021    AST 12 02/09/2021    ALT 16 02/09/2021       Prenatal Labs: Reviewed      Blood type: A+  Antibody: negative  Group B strep: positive  HIV: negative  Hepatitis B: negative  RPR: non-reactive  Rubella: Immune  Varicella: Immune  1 hour Glucose: 122  Platelets: 124  Hgb: 11 7    >2 Midnights  INPATIENT       Max Tolbert MD  OB/GYN PGY-1  9/15/2021  4:12 PM

## 2021-09-15 NOTE — PROGRESS NOTES
24 y o   at 42w4d, here for return OB visit  Feeling well overall and without concerns  Good FM  Denies LOF, VB, contractions  Denies dysuria, hematuria  Problem List Items Addressed This Visit        Other    GBS bacteriuria    Obesity affecting pregnancy in third trimester     For routine  testing today: AILYN without 2x2 pocket of fluid  One pocked witih 3cm of fluid, less than 2cm in width  Total of <5cm fluid  -no formal BPP performed, but fetal breathing movement noted on ultrasound  -sent to L&D for repeat evaluation/monitoring   Encouraged hydration en route         Encounter for supervision of normal first pregnancy in third trimester     -precautions reviewed  -prepregnancy BMI 40 with TWG = 23#  -GBS bacteriuria           Other Visit Diagnoses     Encounter for supervision of other normal pregnancy in third trimester    -  Primary

## 2021-09-15 NOTE — PROGRESS NOTES
Pt is here for routine ob visit w/ NST/AILYN   No concerns at time   Unable to void   No LOF,Vb,Contractions   +FM   Delivery consent signed   Declined vaccines   GBS +

## 2021-09-15 NOTE — ASSESSMENT & PLAN NOTE
For routine  testing today: AILYN without 2x2 pocket of fluid  One pocked witih 3cm of fluid, less than 2cm in width  Total of <5cm fluid  -no formal BPP performed, but fetal breathing movement noted on ultrasound  -sent to L&D for repeat evaluation/monitoring   Encouraged hydration en route

## 2021-09-16 ENCOUNTER — ANESTHESIA EVENT (INPATIENT)
Dept: ANESTHESIOLOGY | Facility: HOSPITAL | Age: 21
End: 2021-09-16
Payer: COMMERCIAL

## 2021-09-16 ENCOUNTER — ANESTHESIA (INPATIENT)
Dept: ANESTHESIOLOGY | Facility: HOSPITAL | Age: 21
End: 2021-09-16
Payer: COMMERCIAL

## 2021-09-16 LAB
BASE EXCESS BLDCOA CALC-SCNC: -8.1 MMOL/L (ref 3–11)
BASE EXCESS BLDCOV CALC-SCNC: -8 MMOL/L (ref 1–9)
EXTERNAL GROUP B STREP ANTIGEN: POSITIVE
HCO3 BLDCOA-SCNC: 18.3 MMOL/L (ref 17.3–27.3)
HCO3 BLDCOV-SCNC: 17.9 MMOL/L (ref 12.2–28.6)
O2 CT VFR BLDCOA CALC: 14.8 ML/DL
OXYHGB MFR BLDCOA: 68.4 %
OXYHGB MFR BLDCOV: 70.8 %
PCO2 BLDCOA: 40.6 MM[HG] (ref 30–60)
PCO2 BLDCOV: 38 MM HG (ref 27–43)
PH BLDCOA: 7.27 [PH] (ref 7.23–7.43)
PH BLDCOV: 7.29 [PH] (ref 7.19–7.49)
PO2 BLDCOA: 29.6 MM HG (ref 5–25)
PO2 BLDCOV: 31.4 MM HG (ref 15–45)
RPR SER QL: NORMAL
SAO2 % BLDCOV: 15.4 ML/DL

## 2021-09-16 PROCEDURE — 82805 BLOOD GASES W/O2 SATURATION: CPT | Performed by: STUDENT IN AN ORGANIZED HEALTH CARE EDUCATION/TRAINING PROGRAM

## 2021-09-16 PROCEDURE — 4A1HXCZ MONITORING OF PRODUCTS OF CONCEPTION, CARDIAC RATE, EXTERNAL APPROACH: ICD-10-PCS | Performed by: OBSTETRICS & GYNECOLOGY

## 2021-09-16 PROCEDURE — 3E033VJ INTRODUCTION OF OTHER HORMONE INTO PERIPHERAL VEIN, PERCUTANEOUS APPROACH: ICD-10-PCS | Performed by: OBSTETRICS & GYNECOLOGY

## 2021-09-16 PROCEDURE — 10907ZC DRAINAGE OF AMNIOTIC FLUID, THERAPEUTIC FROM PRODUCTS OF CONCEPTION, VIA NATURAL OR ARTIFICIAL OPENING: ICD-10-PCS | Performed by: OBSTETRICS & GYNECOLOGY

## 2021-09-16 RX ORDER — OXYTOCIN/RINGER'S LACTATE 30/500 ML
1-30 PLASTIC BAG, INJECTION (ML) INTRAVENOUS
Status: DISCONTINUED | OUTPATIENT
Start: 2021-09-16 | End: 2021-09-16

## 2021-09-16 RX ORDER — CALCIUM CARBONATE 200(500)MG
1000 TABLET,CHEWABLE ORAL DAILY PRN
Status: DISCONTINUED | OUTPATIENT
Start: 2021-09-16 | End: 2021-09-18 | Stop reason: HOSPADM

## 2021-09-16 RX ORDER — SIMETHICONE 80 MG
80 TABLET,CHEWABLE ORAL 4 TIMES DAILY PRN
Status: DISCONTINUED | OUTPATIENT
Start: 2021-09-16 | End: 2021-09-18 | Stop reason: HOSPADM

## 2021-09-16 RX ORDER — ONDANSETRON 2 MG/ML
4 INJECTION INTRAMUSCULAR; INTRAVENOUS EVERY 8 HOURS PRN
Status: DISCONTINUED | OUTPATIENT
Start: 2021-09-16 | End: 2021-09-18 | Stop reason: HOSPADM

## 2021-09-16 RX ORDER — DIPHENHYDRAMINE HCL 25 MG
25 TABLET ORAL EVERY 6 HOURS PRN
Status: DISCONTINUED | OUTPATIENT
Start: 2021-09-16 | End: 2021-09-18 | Stop reason: HOSPADM

## 2021-09-16 RX ORDER — ACETAMINOPHEN 325 MG/1
650 TABLET ORAL EVERY 4 HOURS PRN
Status: DISCONTINUED | OUTPATIENT
Start: 2021-09-16 | End: 2021-09-18 | Stop reason: HOSPADM

## 2021-09-16 RX ORDER — IBUPROFEN 600 MG/1
600 TABLET ORAL EVERY 6 HOURS PRN
Status: DISCONTINUED | OUTPATIENT
Start: 2021-09-16 | End: 2021-09-18 | Stop reason: HOSPADM

## 2021-09-16 RX ORDER — OXYTOCIN/RINGER'S LACTATE 30/500 ML
250 PLASTIC BAG, INJECTION (ML) INTRAVENOUS ONCE
Status: DISCONTINUED | OUTPATIENT
Start: 2021-09-16 | End: 2021-09-18 | Stop reason: HOSPADM

## 2021-09-16 RX ORDER — DOCUSATE SODIUM 100 MG/1
100 CAPSULE, LIQUID FILLED ORAL 2 TIMES DAILY
Status: DISCONTINUED | OUTPATIENT
Start: 2021-09-17 | End: 2021-09-18 | Stop reason: HOSPADM

## 2021-09-16 RX ORDER — DIAPER,BRIEF,INFANT-TODD,DISP
1 EACH MISCELLANEOUS 4 TIMES DAILY PRN
Status: DISCONTINUED | OUTPATIENT
Start: 2021-09-16 | End: 2021-09-18 | Stop reason: HOSPADM

## 2021-09-16 RX ORDER — LIDOCAINE HYDROCHLORIDE AND EPINEPHRINE 15; 5 MG/ML; UG/ML
INJECTION, SOLUTION EPIDURAL
Status: COMPLETED | OUTPATIENT
Start: 2021-09-16 | End: 2021-09-16

## 2021-09-16 RX ADMIN — SODIUM CHLORIDE, SODIUM LACTATE, POTASSIUM CHLORIDE, AND CALCIUM CHLORIDE 999 ML/HR: .6; .31; .03; .02 INJECTION, SOLUTION INTRAVENOUS at 16:15

## 2021-09-16 RX ADMIN — WITCH HAZEL 1 PAD: 500 SOLUTION RECTAL; TOPICAL at 23:29

## 2021-09-16 RX ADMIN — HYDROCORTISONE 1 APPLICATION: 1 CREAM TOPICAL at 23:29

## 2021-09-16 RX ADMIN — Medication: at 23:29

## 2021-09-16 RX ADMIN — SODIUM CHLORIDE, SODIUM LACTATE, POTASSIUM CHLORIDE, AND CALCIUM CHLORIDE 125 ML/HR: .6; .31; .03; .02 INJECTION, SOLUTION INTRAVENOUS at 18:58

## 2021-09-16 RX ADMIN — SODIUM CHLORIDE 5 MILLION UNITS: 0.9 INJECTION, SOLUTION INTRAVENOUS at 08:25

## 2021-09-16 RX ADMIN — SODIUM CHLORIDE, SODIUM LACTATE, POTASSIUM CHLORIDE, AND CALCIUM CHLORIDE 125 ML/HR: .6; .31; .03; .02 INJECTION, SOLUTION INTRAVENOUS at 08:25

## 2021-09-16 RX ADMIN — ROPIVACAINE HYDROCHLORIDE: 2 INJECTION, SOLUTION EPIDURAL; INFILTRATION at 17:00

## 2021-09-16 RX ADMIN — LIDOCAINE HYDROCHLORIDE AND EPINEPHRINE 5 ML: 15; 5 INJECTION, SOLUTION EPIDURAL at 16:56

## 2021-09-16 RX ADMIN — SODIUM CHLORIDE 2.5 MILLION UNITS: 9 INJECTION, SOLUTION INTRAVENOUS at 17:30

## 2021-09-16 RX ADMIN — ONDANSETRON 4 MG: 2 INJECTION INTRAMUSCULAR; INTRAVENOUS at 16:26

## 2021-09-16 RX ADMIN — Medication 2 MILLI-UNITS/MIN: at 08:50

## 2021-09-16 RX ADMIN — SODIUM CHLORIDE 2.5 MILLION UNITS: 9 INJECTION, SOLUTION INTRAVENOUS at 12:28

## 2021-09-16 RX ADMIN — ONDANSETRON 4 MG: 2 INJECTION INTRAMUSCULAR; INTRAVENOUS at 09:50

## 2021-09-16 NOTE — PLAN OF CARE
Problem: Knowledge Deficit  Goal: Verbalizes understanding of labor plan  Description: Assess patient/family/caregiver's baseline knowledge level and ability to understand information  Provide education via patient/family/caregiver's preferred learning method at appropriate level of understanding  1  Provide teaching at level of understanding  2  Provide teaching via preferred learning method(s)  Outcome: Progressing  Goal: Patient/family/caregiver demonstrates understanding of disease process, treatment plan, medications, and discharge instructions  Description: Complete learning assessment and assess knowledge base  Interventions:  - Provide teaching at level of understanding  - Provide teaching via preferred learning methods  Outcome: Progressing     Problem: Labor & Delivery  Goal: Manages discomfort  Description: Assess and monitor for signs and symptoms of discomfort  Assess patient's pain level regularly and per hospital policy  Administer medications as ordered  Support use of nonpharmacological methods to help control pain such as distraction, imagery, relaxation, and application of heat and cold  Collaborate with interdisciplinary team and patient to determine appropriate pain management plan  1  Include patient in decisions related to comfort  2  Offer non-pharmacological pain management interventions  3  Report ineffective pain management to physician  Outcome: Progressing  Goal: Patient vital signs are stable  Description: 1  Assess vital signs - vaginal delivery    Outcome: Progressing     Problem: PAIN - ADULT  Goal: Verbalizes/displays adequate comfort level or baseline comfort level  Description: Interventions:  - Encourage patient to monitor pain and request assistance  - Assess pain using appropriate pain scale  - Administer analgesics based on type and severity of pain and evaluate response  - Implement non-pharmacological measures as appropriate and evaluate response  - Consider cultural and social influences on pain and pain management  - Notify physician/advanced practitioner if interventions unsuccessful or patient reports new pain  Outcome: Progressing     Problem: INFECTION - ADULT  Goal: Absence or prevention of progression during hospitalization  Description: INTERVENTIONS:  - Assess and monitor for signs and symptoms of infection  - Monitor lab/diagnostic results  - Monitor all insertion sites, i e  indwelling lines, tubes, and drains  - Monitor endotracheal if appropriate and nasal secretions for changes in amount and color  - Maz[place] appropriate cooling/warming therapies per order  - Administer medications as ordered  - Instruct and encourage patient and family to use good hand hygiene technique  - Identify and instruct in appropriate isolation precautions for identified infection/condition  Outcome: Progressing  Goal: Absence of fever/infection during neutropenic period  Description: INTERVENTIONS:  - Monitor WBC    Outcome: Progressing     Problem: SAFETY ADULT  Goal: Patient will remain free of falls  Description: INTERVENTIONS:  - Educate patient/family on patient safety including physical limitations  - Instruct patient to call for assistance with activity   - Consult OT/PT to assist with strengthening/mobility   - Keep Call bell within reach  - Keep bed low and locked with side rails adjusted as appropriate  - Keep care items and personal belongings within reach  - Initiate and maintain comfort rounds  - Make Fall Risk Sign visible to staff  - - Apply yellow socks and bracelet for high fall risk patients  - Consider moving patient to room near nurses station  Outcome: Progressing  Goal: Maintain or return to baseline ADL function  Description: INTERVENTIONS:  -  Assess patient's ability to carry out ADLs; assess patient's baseline for ADL function and identify physical deficits which impact ability to perform ADLs (bathing, care of mouth/teeth, toileting, grooming, dressing, etc )  - Assess/evaluate cause of self-care deficits   - Assess range of motion  - Assess patient's mobility; develop plan if impaired  - Assess patient's need for assistive devices and provide as appropriate  - Encourage maximum independence but intervene and supervise when necessary  - Involve family in performance of ADLs  - Assess for home care needs following discharge   - Consider OT consult to assist with ADL evaluation and planning for discharge  - Provide patient education as appropriate  Outcome: Progressing  Goal: Maintains/Returns to pre admission functional level  Description: INTERVENTIONS:  - Perform BMAT or MOVE assessment daily    - Set and communicate daily mobility goal to care team and patient/family/caregiver     - Collaborate with rehabilitation services on mobility goals if consulted  - - Out of bed for toileting  - Record patient progress and toleration of activity level   Outcome: Progressing     Problem: DISCHARGE PLANNING  Goal: Discharge to home or other facility with appropriate resources  Description: INTERVENTIONS:  - Identify barriers to discharge w/patient and caregiver  - Arrange for needed discharge resources and transportation as appropriate  - Identify discharge learning needs (meds, wound care, etc )  - Arrange for interpretive services to assist at discharge as needed  - Refer to Case Management Department for coordinating discharge planning if the patient needs post-hospital services based on physician/advanced practitioner order or complex needs related to functional status, cognitive ability, or social support system  Outcome: Progressing

## 2021-09-16 NOTE — OB LABOR/OXYTOCIN SAFETY PROGRESS
Oxytocin Safety Progress Check Note - Garnell Sports 24 y o  female MRN: [id number]    Unit/Bed#: -01 Encounter: [id number]    Dose (flor-units/min) Oxytocin: 14 flor-units/min  Contraction Frequency (minutes): 2-3  Contraction Quality: Moderate  Tachysystole: No   Cervical Dilation: 5-6        Cervical Effacement: 70  Fetal Station: -1  Baseline Rate: 135 bpm  Fetal Heart Rate: 126 BPM  FHR Category: Category I           Vital Signs:   Vitals:    09/16/21 1507   BP: 146/84   Pulse: 76   Resp:    Temp:    SpO2:      Notes/comments:   Pt feels pain with contractions and is asking for an epidural  SVE as above and unchanged from prior exam  Pitocin at 14, continue titrating  FHT category 1, ayaz q3 mins  Will recheck in 2h or as clinically indicated      D/w Dr Robles Shook MD 9/16/2021 3:54 PM

## 2021-09-16 NOTE — OB LABOR/OXYTOCIN SAFETY PROGRESS
Oxytocin Safety Progress Check Note - Radha Postal 24 y o  female MRN: [id number]    Unit/Bed#: -01 Encounter: [id number]    Dose (flor-units/min) Oxytocin: 10 flor-units/min  Contraction Frequency (minutes): 1 5-2  Contraction Quality: Moderate  Tachysystole: No   Cervical Dilation: 5-6        Cervical Effacement: 70  Fetal Station: -1  Baseline Rate: 135 bpm  Fetal Heart Rate: 138 BPM  FHR Category: Category I               Vital Signs:   Vitals:    09/16/21 1255   BP: 142/75   Pulse: 90   Resp:    Temp: 97 8 °F (36 6 °C)   SpO2:            Notes/comments:   Pt comfortable with epidural    Unchanged at this cervical evaluation    FHT:  category I,  Base line 130, moderate variability, ctx q 2 min,  Currently without decelerations      Will recheck pt in 2 hours, sooner if patient uncomfortable       Alexa Belcher MD 9/16/2021 1:25 PM

## 2021-09-16 NOTE — ANESTHESIA PREPROCEDURE EVALUATION
Procedure:  LABOR ANALGESIA    Relevant Problems   GYN   (+) 37 weeks gestation of pregnancy   (+) Encounter for supervision of normal first pregnancy in third trimester      Other   (+) Obesity affecting pregnancy in third trimester        Physical Exam    Airway      TM Distance: >3 FB  Neck ROM: full     Dental       Cardiovascular  Cardiovascular exam normal    Pulmonary  Pulmonary exam normal     Other Findings        Anesthesia Plan  ASA Score- 3     Anesthesia Type- epidural with ASA Monitors  Additional Monitors:   Airway Plan:           Plan Factors-Exercise tolerance (METS): >4 METS  Chart reviewed  EKG reviewed  Imaging results reviewed  Existing labs reviewed  Patient summary reviewed  Induction-     Postoperative Plan-     Informed Consent- Anesthetic plan and risks discussed with patient  I personally reviewed this patient with the CRNA  Discussed and agreed on the Anesthesia Plan with the CRNA  Geetha Aceves

## 2021-09-16 NOTE — OB LABOR/OXYTOCIN SAFETY PROGRESS
Labor Progress Note - Felecia Retana 24 y o  female MRN: [id number]    Unit/Bed#: -01 Encounter: [id number]       Contraction Frequency (minutes): 1 5-6  Contraction Quality: Mild  Tachysystole: No   Cervical Dilation: 2        Cervical Effacement: 50  Fetal Station: -2  Baseline Rate: 130 bpm  Fetal Heart Rate: 157 BPM  FHR Category: Category I               Vital Signs:   Vitals:    09/16/21 0553   BP: 125/72   Pulse: 91   Resp: 16   Temp: 98 5 °F (36 9 °C)   SpO2: 100%           Notes/comments:    SVE 2/50/-2  FHT category 1  [place] inconsistent  Plan to start pitocin for induction  Consent signed  Dr Salguero Ohm aware      Myrna Romano MD 9/16/2021 8:25 AM

## 2021-09-16 NOTE — OB LABOR/OXYTOCIN SAFETY PROGRESS
Oxytocin Safety Progress Check Note - Rachel Aguirre 24 y o  female MRN: [id number]    Unit/Bed#: -01 Encounter: [id number]    Dose (flor-units/min) Oxytocin: 16 flor-units/min  Contraction Frequency (minutes): 2-4  Contraction Quality: Moderate  Tachysystole: No   Cervical Dilation: 5-6        Cervical Effacement: 70  Fetal Station: -1  Baseline Rate: 140 bpm  Fetal Heart Rate: 130 BPM  FHR Category: Category I               Vital Signs:   Vitals:    09/16/21 1737   BP: 137/67   Pulse: 75   Resp:    Temp:    SpO2:            Notes/comments: The patient is feeling significant rectal pressure  Her cervical exam is unchanged  Fetus appears asynclitic  Will place a peanut ball to improve fetal position     FHT: Cat 1: 135/moderate/accelerations, no decelerations  [place]: q1-2 minutes        Patrick Chowdary MD 9/16/2021 5:50 PM

## 2021-09-16 NOTE — OB LABOR/OXYTOCIN SAFETY PROGRESS
Oxytocin Safety Progress Check Note - Leonila Oden 24 y o  female MRN: [id number]    Unit/Bed#: -01 Encounter: [id number]    Dose (flor-units/min) Oxytocin: 2 flor-units/min  Contraction Frequency (minutes): 3-5  Contraction Quality: Mild  Tachysystole: No   Cervical Dilation: 4        Cervical Effacement: 80  Fetal Station: -1  Baseline Rate: 130 bpm  Fetal Heart Rate: 135 BPM  FHR Category: Category I               Vital Signs:   Vitals:    09/16/21 0817   BP: 141/74   Pulse: 94   Resp:    Temp: 98 5 °F (36 9 °C)   SpO2:            Notes/comments: At bedside to recheck patient  SVE at this time 4/80/1  Cervix anterior and soft  Patient currently fairly comfortable with contractions    FHT baseline 130, moderate variability, category 1 tracing at this time ayaz Q 1-5 on toco   Will continue Pitocin titration    Humaira Scott MD 9/16/2021 9:11 AM

## 2021-09-16 NOTE — OB LABOR/OXYTOCIN SAFETY PROGRESS
Oxytocin Safety Progress Check Note - Leonila Oden 24 y o  female MRN: [id number]    Unit/Bed#: -01 Encounter: [id number]    Dose (flor-units/min) Oxytocin: 10 flor-units/min  Contraction Frequency (minutes): 1-2  Contraction Quality: Mild  Tachysystole: No   Cervical Dilation: 5-6        Cervical Effacement: 80  Fetal Station: 0  Baseline Rate: 140 bpm  Fetal Heart Rate: 135 BPM  FHR Category: Category I               Vital Signs:   Vitals:    09/16/21 0817   BP: 141/74   Pulse: 94   Resp:    Temp: 98 5 °F (36 9 °C)   SpO2:            Notes/comments:   Resting comfortably in bed  Currently not feeling many contractions  AROM for clear fluid at this time  SVE 5 5/80/0  FHT baseline 130, moderate variability, category I tracing at this time  Marla Q1-2 on toco  Will plan for recheck in 2 hours or sooner if indicated  Dr Cyndie Carrillo aware       Humaira Scott MD 9/16/2021 11:37 AM

## 2021-09-16 NOTE — PLAN OF CARE
Problem: Knowledge Deficit  Goal: Verbalizes understanding of labor plan  Description: Assess patient/family/caregiver's baseline knowledge level and ability to understand information  Provide education via patient/family/caregiver's preferred learning method at appropriate level of understanding  1  Provide teaching at level of understanding  2  Provide teaching via preferred learning method(s)  Outcome: Progressing  Goal: Patient/family/caregiver demonstrates understanding of disease process, treatment plan, medications, and discharge instructions  Description: Complete learning assessment and assess knowledge base  Interventions:  - Provide teaching at level of understanding  - Provide teaching via preferred learning methods  Outcome: Progressing     Problem: Labor & Delivery  Goal: Manages discomfort  Description: Assess and monitor for signs and symptoms of discomfort  Assess patient's pain level regularly and per hospital policy  Administer medications as ordered  Support use of nonpharmacological methods to help control pain such as distraction, imagery, relaxation, and application of heat and cold  Collaborate with interdisciplinary team and patient to determine appropriate pain management plan  1  Include patient in decisions related to comfort  2  Offer non-pharmacological pain management interventions  3  Report ineffective pain management to physician  Outcome: Progressing  Goal: Patient vital signs are stable  Description: 1  Assess vital signs - vaginal delivery    Outcome: Progressing     Problem: PAIN - ADULT  Goal: Verbalizes/displays adequate comfort level or baseline comfort level  Description: Interventions:  - Encourage patient to monitor pain and request assistance  - Assess pain using appropriate pain scale  - Administer analgesics based on type and severity of pain and evaluate response  - Implement non-pharmacological measures as appropriate and evaluate response  - Consider cultural and social influences on pain and pain management  - Notify physician/advanced practitioner if interventions unsuccessful or patient reports new pain  Outcome: Progressing     Problem: INFECTION - ADULT  Goal: Absence or prevention of progression during hospitalization  Description: INTERVENTIONS:  - Assess and monitor for signs and symptoms of infection  - Monitor lab/diagnostic results  - Monitor all insertion sites, i e  indwelling lines, tubes, and drains  - Monitor endotracheal if appropriate and nasal secretions for changes in amount and color  - [place] appropriate cooling/warming therapies per order  - Administer medications as ordered  - Instruct and encourage patient and family to use good hand hygiene technique  - Identify and instruct in appropriate isolation precautions for identified infection/condition  Outcome: Progressing  Goal: Absence of fever/infection during neutropenic period  Description: INTERVENTIONS:  - Monitor WBC    Outcome: Progressing     Problem: SAFETY ADULT  Goal: Patient will remain free of falls  Description: INTERVENTIONS:  - Educate patient/family on patient safety including physical limitations  - Instruct patient to call for assistance with activity   - Consult OT/PT to assist with strengthening/mobility   - Keep Call bell within reach  - Keep bed low and locked with side rails adjusted as appropriate  - Keep care items and personal belongings within reach  - Initiate and maintain comfort rounds  - Make Fall Risk Sign visible to staff  - Offer Toileting every  Hours, in advance of need  - Initiate/Maintain alarm  - Obtain necessary fall risk management equipment:   - Apply yellow socks and bracelet for high fall risk patients  - Consider moving patient to room near nurses station  Outcome: Progressing  Goal: Maintain or return to baseline ADL function  Description: INTERVENTIONS:  -  Assess patient's ability to carry out ADLs; assess patient's baseline for ADL function and identify physical deficits which impact ability to perform ADLs (bathing, care of mouth/teeth, toileting, grooming, dressing, etc )  - Assess/evaluate cause of self-care deficits   - Assess range of motion  - Assess patient's mobility; develop plan if impaired  - Assess patient's need for assistive devices and provide as appropriate  - Encourage maximum independence but intervene and supervise when necessary  - Involve family in performance of ADLs  - Assess for home care needs following discharge   - Consider OT consult to assist with ADL evaluation and planning for discharge  - Provide patient education as appropriate  Outcome: Progressing  Goal: Maintains/Returns to pre admission functional level  Description: INTERVENTIONS:  - Perform BMAT or MOVE assessment daily    - Set and communicate daily mobility goal to care team and patient/family/caregiver  - Collaborate with rehabilitation services on mobility goals if consulted  - Perform Range of Motion times a day  - Reposition patient every hours    - Dangle patient  times a day  - Stand patient  times a day  - Ambulate patient times a day  - Out of bed to chair  times a day   - Out of bed for meals times a day  - Out of bed for toileting  - Record patient progress and toleration of activity level   Outcome: Progressing     Problem: DISCHARGE PLANNING  Goal: Discharge to home or other facility with appropriate resources  Description: INTERVENTIONS:  - Identify barriers to discharge w/patient and caregiver  - Arrange for needed discharge resources and transportation as appropriate  - Identify discharge learning needs (meds, wound care, etc )  - Arrange for interpretive services to assist at discharge as needed  - Refer to Case Management Department for coordinating discharge planning if the patient needs post-hospital services based on physician/advanced practitioner order or complex needs related to functional status, cognitive ability, or social support system  Outcome: Progressing

## 2021-09-16 NOTE — ANESTHESIA PROCEDURE NOTES
Epidural Block    Patient location during procedure: OB  Reason for block: procedure for pain and at surgeon's request  Preanesthetic Checklist  Completed: patient identified, IV checked, site marked, risks and benefits discussed, surgical consent, monitors and equipment checked, pre-op evaluation and timeout performed  Epidural  Patient position: sitting  Prep: ChloraPrep  Patient monitoring: cardiac monitor and frequent blood pressure checks  Approach: midline  Location: lumbar  Injection technique: CARMENZA air  Needle  Needle type: Tuohy   Needle gauge: 17 G  Catheter type: side hole  Catheter size: 19 G  Catheter at skin depth: 15 cm  Catheter securement method: stabilization device  Test dose: negativelidocaine 1 5% with epinephrine 1:200,000 test dose, 5 mL  Assessment  Number of attempts: 1negative aspiration for CSF, negative aspiration for heme and no paresthesia on injection  patient tolerated the procedure well with no immediate complications

## 2021-09-17 PROCEDURE — 99024 POSTOP FOLLOW-UP VISIT: CPT | Performed by: STUDENT IN AN ORGANIZED HEALTH CARE EDUCATION/TRAINING PROGRAM

## 2021-09-17 PROCEDURE — 88307 TISSUE EXAM BY PATHOLOGIST: CPT | Performed by: PATHOLOGY

## 2021-09-17 PROCEDURE — 59400 OBSTETRICAL CARE: CPT | Performed by: OBSTETRICS & GYNECOLOGY

## 2021-09-17 RX ADMIN — IBUPROFEN 600 MG: 600 TABLET ORAL at 03:19

## 2021-09-17 RX ADMIN — IBUPROFEN 600 MG: 600 TABLET ORAL at 16:57

## 2021-09-17 RX ADMIN — DOCUSATE SODIUM 100 MG: 100 CAPSULE ORAL at 16:57

## 2021-09-17 RX ADMIN — ACETAMINOPHEN 650 MG: 325 TABLET, FILM COATED ORAL at 07:16

## 2021-09-17 NOTE — DISCHARGE SUMMARY
Discharge Summary - Ivonne Bowman 24 y o  female MRN: [id number]    Unit/Bed#: -01 Encounter: [id number]    Admission Date: 9/15/2021     Discharge Date: 2021    Admitting Diagnosis:   Patient Active Problem List   Diagnosis    Vitamin D deficiency    Obesity affecting pregnancy in third trimester    Encounter for supervision of normal first pregnancy in third trimester    GBS bacteriuria    Chlamydia infection affecting pregnancy in first trimester    Spontaneous vaginal delivery       Discharge Diagnosis:   Same, delivered    Procedures:   spontaneous vaginal delivery    Admitting Attending: Dr Gonsales Poag  Delivery Attending: Dr Stacy Adkins MD  Discharge Attending: Dr Isis Mack Course:     Ivonne Bowman is a 24 y o  [place] Abo who was admitted at 42w2d for induction for oligohydramnios  She received a Cytotec and Stewart balloon for cervical ripening  She was started on penicillin for GBS prophylaxis  Pitocin was then started, she received an epidural, she was artificially ruptured for clear fluid  She then progressed to fully dilated without complication  She delivered a viable female  on 21 at 2123  Weight 7lbs 3 9oz via spontaneous vaginal delivery  Apgars were 9 (1 min) and 9 (5 min)   was transferred to  nursery  Patient tolerated the procedure well and was transferred to recovery in stable condition  Her post-partum course was uncomplicated  Her post-partum pain was well controlled with oral analgesics  The patient's post partum course was unremarkable  On day of discharge, she was ambulating and able to reasonably perform all ADLs  She was voiding and had appropriate bowel function  Pain was well controlled  She was discharged home on postpartum day #2 without complications   Patient was instructed to follow up with her OB as an outpatient and was given appropriate warnings to call doctor or provider if she develops signs of infection or uncontrolled pain  On day of discharge she was ambulating, voiding spontaneously, tolerating oral intake and hemodynamically stable  Mom's blood type is A positive and  Rhogam was not given    Disposition: Home    Planned Readmission: No    Discharge Medications:   Prenatal vitamin daily for 6 months or the duration of nursing, whichever is longer  Motrin 600 mg orally every 6 hours as needed for pain  Tylenol (over the counter) per bottle directions as needed for pain  Hydrocortisone cream 1% (over the counter) applied 1-2x daily to perineum as needed  Witch hazel pads for perineal discomfort as needed    Discharge instructions :   -Do not place anything (no partner, tampons or douche) in your vagina for 6 weeks  -You may walk for exercise for the first 6 weeks then gradually return to your usual activities    -Please do not drive for 1 week if you have no stitches and for 2 weeks if you have stitches or underwent a  delivery     -You may take baths or shower per your preference    -Please look at your bust (breasts) in the mirror daily and call your doctor for redness or tenderness or increased warmth  - If you have had a  section please look at your incision daily as well and call provider for increasing redness or steady drainage from the incision    -Please call your doctor's office if temperature > 100 4*F or 38* C, worsening pain or a foul discharge      Follow Up:  - Follow up in 3-6 weeks for postpartum visit    Kelton Haines MD  Obstetrics & Gynecology PGY-1  2021  6:43 AM

## 2021-09-17 NOTE — L&D DELIVERY NOTE
Vaginal Delivery Summary - OB/GYN   August Adame 24 y o  female MRN: [id number]  Unit/Bed#: -01 Encounter: [id number]          Predelivery Diagnosis:  1  Pregnancy at 37w2d  2  GBS positive  3  History of chlamydia  4  Anemia     Postdelivery Diagnosis:  1  Same as above  2  Delivery of term     Procedure: Spontaneous Vaginal Delivery    Attendin  [facility]    Assistant:     Anesthesia: Epidural    QBL: 22cc  Admission H 4  Admission platelets: 264    Complications: none apparent    Specimens: cord blood, arterial and venous cord blood gasses, placenta to pathology    Findings:   1  Viable female at 37w2d, with APGARS of 9 and 9 at 1 and 5 minutes respectively,  2  Spontaneous delivery of intact placenta   3  Blood gases:    Umbilical Cord Venous Blood Gas:  Results from last 7 days   Lab Units 21  2130   PH COV  7 290   PCO2 COV mm HG 38 0   HCO3 COV mmol/L 17 9   BASE EXC COV mmol/L -8 0*   O2 CT CD VB mL/dL 15 4   O2 HGB, VENOUS CORD % 53 2     Umbilical Cord Arterial Blood Gas:  Results from last 7 days   Lab Units 21  2130   PH COA  7 272   PCO2 COA  40 6   PO2 COA mm HG 29 6*   HCO3 COA mmol/L 18 3   BASE EXC COA mmol/L -8 1*   O2 CONTENT CORD ART ml/dl 14 8   O2 HGB, ARTERIAL CORD % 68 4       Disposition:  Patient tolerated the procedure well and was recovering in labor and delivery room     Brief history and labor course:  Ms August Adame is a 24 y o  Allayne Aym at 37wknd  She presented to labor and delivery and was induced for oligohydramnios  She received a Cytotec and Stewart balloon for cervical ripening  She was started on penicillin for GBS prophylaxis  Pitocin was then started, she received an epidural, she was artificially ruptured for clear fluid  She then progressed to fully dilated without complication  Description of procedure    After pushing for 40 minutes, at 2123 patient delivered a viable female , wt pending, apgars of 9 (1 min) and 9 (5 min)   The fetal vertex delivered spontaneously  Baby was checked for nuchal  The anterior shoulder delivered atraumatically with maternal expulsive forces and the assistance of downward traction  The posterior shoulder delivered with maternal expulsive forces and the assistance of upward traction  The remainder of the fetus delivered spontaneously  Upon delivery, the infant was placed on the mothers abdomen and the cord was clamped and cut  Delayed cord clamping was performed  The infant was noted to cry spontaneously and was moving all extremities appropriately  There was no evidence for injury  Awaiting nurse resuscitators evaluated the   Arterial and venous cord blood gases and cord blood was collected for analysis  These were promptly sent to the lab  In the immediate post-partum, 30 units of IV pitocin was administered, and the uterus was noted to contract down well with massage and pitocin  The placenta delivered spontaneously at  and was noted to have a centrally inserted 3 vessel cord  The vagina, cervix, perineum, and rectum were inspected and noted to be intact  At the conclusion of the procedure, all needle, sponge, and instrument counts were noted to be correct  Patient tolerated the procedure well and was allowed to recover in labor and delivery room with family and  before being transferred to the post-partum floor  Dr Naty Boo was present and participated in all key portions of the case          Ashly Hatfield MD PGY-1  Obstetrics & Gynecology  2021  3:10 AM

## 2021-09-17 NOTE — PLAN OF CARE
Problem: Knowledge Deficit  Goal: Verbalizes understanding of labor plan  Description: Assess patient/family/caregiver's baseline knowledge level and ability to understand information  Provide education via patient/family/caregiver's preferred learning method at appropriate level of understanding  1  Provide teaching at level of understanding  2  Provide teaching via preferred learning method(s)  9/16/2021 2208 by Jus Alegria RN  Outcome: Completed  9/16/2021 1913 by Jus Alegria RN  Outcome: Progressing  Goal: Patient/family/caregiver demonstrates understanding of disease process, treatment plan, medications, and discharge instructions  Description: Complete learning assessment and assess knowledge base  Interventions:  - Provide teaching at level of understanding  - Provide teaching via preferred learning methods  9/16/2021 2208 by Jus Alegria RN  Outcome: Completed  9/16/2021 1913 by Jus Alegria RN  Outcome: Progressing     Problem: Labor & Delivery  Goal: Manages discomfort  Description: Assess and monitor for signs and symptoms of discomfort  Assess patient's pain level regularly and per hospital policy  Administer medications as ordered  Support use of nonpharmacological methods to help control pain such as distraction, imagery, relaxation, and application of heat and cold  Collaborate with interdisciplinary team and patient to determine appropriate pain management plan  1  Include patient in decisions related to comfort  2  Offer non-pharmacological pain management interventions  3  Report ineffective pain management to physician  9/16/2021 2208 by Jus Alegria RN  Outcome: Completed  9/16/2021 1913 by Jus Alegria RN  Outcome: Progressing  Goal: Patient vital signs are stable  Description: 1  Assess vital signs - vaginal delivery    9/16/2021 2208 by Jus Alegria RN  Outcome: Completed  9/16/2021 1913 by Jus Alegria RN  Outcome: Progressing     Problem: PAIN - ADULT  Goal: Verbalizes/displays adequate comfort level or baseline comfort level  Description: Interventions:  - Encourage patient to monitor pain and request assistance  - Assess pain using appropriate pain scale  - Administer analgesics based on type and severity of pain and evaluate response  - Implement non-pharmacological measures as appropriate and evaluate response  - Consider cultural and social influences on pain and pain management  - Notify physician/advanced practitioner if interventions unsuccessful or patient reports new pain  9/16/2021 2208 by Sean Mercado RN  Outcome: Progressing  9/16/2021 1913 by Sean Mercado RN  Outcome: Progressing     Problem: INFECTION - ADULT  Goal: Absence or prevention of progression during hospitalization  Description: INTERVENTIONS:  - Assess and monitor for signs and symptoms of infection  - Monitor lab/diagnostic results  - Monitor all insertion sites, i e  indwelling lines, tubes, and drains  - Monitor endotracheal if appropriate and nasal secretions for changes in amount and color  - Michael appropriate cooling/warming therapies per order  - Administer medications as ordered  - Instruct and encourage patient and family to use good hand hygiene technique  - Identify and instruct in appropriate isolation precautions for identified infection/condition  9/16/2021 2208 by Sean Mercado RN  Outcome: Progressing  9/16/2021 1913 by Sean Mercado RN  Outcome: Progressing  Goal: Absence of fever/infection during neutropenic period  Description: INTERVENTIONS:  - Monitor WBC    9/16/2021 2208 by Sean Mercado RN  Outcome: Progressing  9/16/2021 1913 by Sean Mercado RN  Outcome: Progressing     Problem: SAFETY ADULT  Goal: Patient will remain free of falls  Description: INTERVENTIONS:  - Educate patient/family on patient safety including physical limitations  - Instruct patient to call for assistance with activity   - Consult OT/PT to assist with strengthening/mobility - Keep Call bell within reach  - Keep bed low and locked with side rails adjusted as appropriate  - Keep care items and personal belongings within reach  - Initiate and maintain comfort rounds  - Make Fall Risk Sign visible to staff  - Offer Toileting every Hours, in advance of need  - Initiate/Maintain alarm  - Obtain necessary fall risk management equipment:   - Apply yellow socks and bracelet for high fall risk patients  - Consider moving patient to room near nurses station  9/16/2021 2208 by Talya Diaz RN  Outcome: Progressing  9/16/2021 1913 by Talya Diaz RN  Outcome: Progressing  Goal: Maintain or return to baseline ADL function  Description: INTERVENTIONS:  -  Assess patient's ability to carry out ADLs; assess patient's baseline for ADL function and identify physical deficits which impact ability to perform ADLs (bathing, care of mouth/teeth, toileting, grooming, dressing, etc )  - Assess/evaluate cause of self-care deficits   - Assess range of motion  - Assess patient's mobility; develop plan if impaired  - Assess patient's need for assistive devices and provide as appropriate  - Encourage maximum independence but intervene and supervise when necessary  - Involve family in performance of ADLs  - Assess for home care needs following discharge   - Consider OT consult to assist with ADL evaluation and planning for discharge  - Provide patient education as appropriate  9/16/2021 2208 by Talya Diaz RN  Outcome: Progressing  9/16/2021 1913 by Talya Diaz RN  Outcome: Progressing  Goal: Maintains/Returns to pre admission functional level  Description: INTERVENTIONS:  - Perform BMAT or MOVE assessment daily    - Set and communicate daily mobility goal to care team and patient/family/caregiver  - Collaborate with rehabilitation services on mobility goals if consulted  - Perform Range of Motion times a day  - Reposition patient every hours    - Dangle patient  times a day  - Stand patient times a day  - Ambulate patient times a day  - Out of bed to chair times a day   - Out of bed for meals times a day  - Out of bed for toileting  - Record patient progress and toleration of activity level   2021 by Omar Santoyo RN  Outcome: Progressing  2021 by Omar Santoyo RN  Outcome: Progressing     Problem: DISCHARGE PLANNING  Goal: Discharge to home or other facility with appropriate resources  Description: INTERVENTIONS:  - Identify barriers to discharge w/patient and caregiver  - Arrange for needed discharge resources and transportation as appropriate  - Identify discharge learning needs (meds, wound care, etc )  - Arrange for interpretive services to assist at discharge as needed  - Refer to Case Management Department for coordinating discharge planning if the patient needs post-hospital services based on physician/advanced practitioner order or complex needs related to functional status, cognitive ability, or social support system  2021 by mOar Santoyo RN  Outcome: Progressing  2021 by Omar Santoyo RN  Outcome: Progressing     Problem: POSTPARTUM  Goal: Experiences normal postpartum course  Description: INTERVENTIONS:  - Monitor maternal vital signs  - Assess uterine involution and lochia  Outcome: Progressing  Goal: Appropriate maternal -  bonding  Description: INTERVENTIONS:  - Identify family support  - Assess for appropriate maternal/infant bonding   -Encourage maternal/infant bonding opportunities  - Referral to  or  as needed  Outcome: Progressing  Goal: Establishment of infant feeding pattern  Description: INTERVENTIONS:  - Assess breast/bottle feeding  - Refer to lactation as needed  Outcome: Progressing  Goal: Incision(s), wounds(s) or drain site(s) healing without S/S of infection  Description: INTERVENTIONS  - Assess and document dressing, incision, wound bed, drain sites and surrounding tissue  - Provide patient and family education  - Perform skin care/dressing changes every   Outcome: Progressing

## 2021-09-17 NOTE — UTILIZATION REVIEW
Inpatient Admission Authorization Request   Notification of Maternity/Delivery &  Birth Information for Admission   SERVICING FACILITY:   [address]  Tax ID: [id number]  NPI: [id number]  [facility]  Place of Service Code: 24     ATTENDING PROVIDER:  Attending Name and NPI#: Lino Wei [address]  Address: [address]  Phone: [phone]     UTILIZATION REVIEW CONTACT:  Lorri aGnt, Utilization   Network Utilization Review Department  Phone: [phone]  Fax [phone]  Email: [email or web address]     PHYSICIAN ADVISORY SERVICES:  FOR RFSF-PF-NCIT REVIEW - MEDICAL NECESSITY DENIAL  Phone: [phone]  Fax: [phone]  Email: [email or web address]     TYPE OF REQUEST:  Inpatient Status     ADMISSION INFORMATION:  ADMISSION DATE/TIME: 9/15/21  4:03 PM  PATIENT DIAGNOSIS CODE/DESCRIPTION:  37 weeks gestation of pregnancy [Z3A 37]  Encounter for full-term uncomplicated delivery [L64] The encounter diagnosis was 37 weeks gestation of pregnancy  1  37 weeks gestation of pregnancy      DISCHARGE DATE/TIME: No discharge date for patient encounter  DISCHARGE DISPOSITION (IF DISCHARGED): Home/Self Care     MOTHER AND  INFORMATION:  Mother: David Sargent 2000   Delivering clinician: Lino Wei   OB History        1    Para   1    Term   1       0    AB   0    Living   1       SAB   0    TAB   0    Ectopic   0    Multiple   0    Live Births   1               [place] Name & MRN:   Information for the patient's :  Bettie Beyer Girl Lonnie Morrison) [[id number]]      Delivery Information:  Sex: female  Delivered 2021 9:23 PM by Vaginal, Spontaneous; Gestational Age: 42w2d     Measurements:  Weight: 7 lb 3 9 oz (3285 g);   Height: 18"    APGAR 1 minute 5 minutes 10 minutes   Totals: 9 9       Birth Information: 24 y o  female MRN: [id number] Unit/Bed#: -01 Estimated Date of Delivery: 10/5/21  Birthweight: No birth weight on file  Gestational Age: <None> Delivery Type: Vaginal, Spontaneous          APGARS  One minute Five minutes Ten minutes   Totals:               IMPORTANT INFORMATION:  Please contact the [facility] directly with any questions or concerns regarding this request  Department voicemails are confidential     Send requests for admission clinical reviews, concurrent reviews, approvals, and administrative denials due to lack of clinical to fax [phone]

## 2021-09-17 NOTE — ANESTHESIA POSTPROCEDURE EVALUATION
Post-Op Assessment Note    CV Status:  Stable    Pain management: adequate  Multimodal analgesia used between 6 hours prior to anesthesia start to PACU discharge    Mental Status:  Alert   PONV Controlled:  Controlled   Airway Patency:  Patent   Two or more mitigation strategies used for obstructive sleep apnea   Post Op Vitals Reviewed: Yes      Staff: Anesthesiologist         No complications documented      BP      Temp      Pulse     Resp      SpO2

## 2021-09-17 NOTE — CASE MANAGEMENT
SW met with MOB, FOB/SO Ashvinr and MOB's mother at bedside with introduction  Mother appeared appropriate and attentive to baby at this time  MOB requested to speak with SW privately, so FOB and MGM agreed to leave the room  MOB reports that "[place]" is first baby for herself and FOB  Reports living with FOB @ [address] [place] [address], [facility] [address] [address]  MOB reports having baby necessities, support system and transportation for d/c and for follow up appointments  MOB on her mother's insurance, has contacted [place] for insurance for baby  Breast feeding, will order pump this admission following lactation assessment  Reports hx depression and anxiety, previously on medication management  SW and MOB discussed UDS results for MOB and baby positive for THC  MOB reported smoking marijuana approximately one month ago for nausea and anxiety  MOB made aware that Childline reporting would be made per mandated reporting requirements  Report file e-Referral ID: [id number]  Anticipate follow up by [facility]  [facility] based on family's address  Request made that as MOB has family members present in room who are unaware of UDS results, MOB requests that worker call her cellphone [phone] so that she can leave room to meet with worker privately  LUIZA following

## 2021-09-17 NOTE — CASE MANAGEMENT
Consult: "Hx THC"    Per review of chart, MOB and baby UDS positive for THC on admission  SW to follow up for assessment once pt transferred to postpartum floor

## 2021-09-17 NOTE — PLAN OF CARE
Problem: PAIN - ADULT  Goal: Verbalizes/displays adequate comfort level or baseline comfort level  Description: Interventions:  - Encourage patient to monitor pain and request assistance  - Assess pain using appropriate pain scale  - Administer analgesics based on type and severity of pain and evaluate response  - Implement non-pharmacological measures as appropriate and evaluate response  - Consider cultural and social influences on pain and pain management  - Notify physician/advanced practitioner if interventions unsuccessful or patient reports new pain  Outcome: Progressing     Problem: INFECTION - ADULT  Goal: Absence or prevention of progression during hospitalization  Description: INTERVENTIONS:  - Assess and monitor for signs and symptoms of infection  - Monitor lab/diagnostic results  - Monitor all insertion sites, i e  indwelling lines, tubes, and drains  - Monitor endotracheal if appropriate and nasal secretions for changes in amount and color  - [place] appropriate cooling/warming therapies per order  - Administer medications as ordered  - Instruct and encourage patient and family to use good hand hygiene technique  - Identify and instruct in appropriate isolation precautions for identified infection/condition  Outcome: Progressing  Goal: Absence of fever/infection during neutropenic period  Description: INTERVENTIONS:  - Monitor WBC    Outcome: Progressing     Problem: SAFETY ADULT  Goal: Patient will remain free of falls  Description: INTERVENTIONS:  - Educate patient/family on patient safety including physical limitations  - Instruct patient to call for assistance with activity   - Consult OT/PT to assist with strengthening/mobility   - Keep Call bell within reach  - Keep bed low and locked with side rails adjusted as appropriate  - Keep care items and personal belongings within reach  - Initiate and maintain comfort rounds  - Make Fall Risk Sign visible to staff  - Offer Toileting every 2-3 Hours, in advance of need  - Initiate/Maintain alarm  - Obtain necessary fall risk management equipment  - Apply yellow socks and bracelet for high fall risk patients  - Consider moving patient to room near nurses station  Outcome: Progressing  Goal: Maintain or return to baseline ADL function  Description: INTERVENTIONS:  -  Assess patient's ability to carry out ADLs; assess patient's baseline for ADL function and identify physical deficits which impact ability to perform ADLs (bathing, care of mouth/teeth, toileting, grooming, dressing, etc )  - Assess/evaluate cause of self-care deficits   - Assess range of motion  - Assess patient's mobility; develop plan if impaired  - Assess patient's need for assistive devices and provide as appropriate  - Encourage maximum independence but intervene and supervise when necessary  - Involve family in performance of ADLs  - Assess for home care needs following discharge   - Consider OT consult to assist with ADL evaluation and planning for discharge  - Provide patient education as appropriate  Outcome: Progressing     Problem: DISCHARGE PLANNING  Goal: Discharge to home or other facility with appropriate resources  Description: INTERVENTIONS:  - Identify barriers to discharge w/patient and caregiver  - Arrange for needed discharge resources and transportation as appropriate  - Identify discharge learning needs (meds, wound care, etc )  - Arrange for interpretive services to assist at discharge as needed  - Refer to Case Management Department for coordinating discharge planning if the patient needs post-hospital services based on physician/advanced practitioner order or complex needs related to functional status, cognitive ability, or social support system  Outcome: Progressing     Problem: POSTPARTUM  Goal: Experiences normal postpartum course  Description: INTERVENTIONS:  - Monitor maternal vital signs  - Assess uterine involution and lochia  Outcome: Progressing  Goal: Appropriate maternal -  bonding  Description: INTERVENTIONS:  - Identify family support  - Assess for appropriate maternal/infant bonding   -Encourage maternal/infant bonding opportunities  - Referral to  or  as needed  Outcome: Progressing  Goal: Establishment of infant feeding pattern  Description: INTERVENTIONS:  - Assess breast/bottle feeding  - Refer to lactation as needed  Outcome: Progressing  Goal: Incision(s), wounds(s) or drain site(s) healing without S/S of infection  Description: INTERVENTIONS  - Assess and document dressing, incision, wound bed, drain sites and surrounding tissue  - Provide patient and family education  - Perform skin care/dressing changes every   Outcome: Progressing

## 2021-09-17 NOTE — PROGRESS NOTES
Progress Note - OB/GYN   Jim Zapien 24 y o  female MRN: [id number]  Unit/Bed#: -01 Encounter: [id number]    Assessment:  Post partum Day #1 s/p , stable, baby in pt room  Blood pressures: 131-140/63-86 mmHg overnight    Plan:  #1   PPD #1  - Pt doing well and continues to remain stable  - Repeat temperature WNL s/p pt c/o of night sweats and chills overnight  #2  Continue routine post partum care  - Encourage ambulation  - Encourage breastfeeding  - Anticipate discharge on PPD #2      Subjective:   Post delivery  Patient is doing well  Lochia WNL  Pain well controlled  Pt reporting mild low back pain at site of epidural injection, however denies BILAT leg pain, numbness or weakness  Voiding without issues  Pt also c/o night sweats and chills but denies rigors  No other concerns      Pain: yes, cramping, improved with meds  Tolerating PO: yes  Voiding: yes  Flatus: yes  BM: yes  Ambulating: yes  Breastfeeding: yes  Chest pain: no  Shortness of breath: no  Leg pain: no  Lochia: WNL      Objective:     Vitals:   Vitals:    21 2245 21 2300 21 0353 21 0709   BP: 138/92 140/63 129/79 131/86   BP Location:  Right arm Left arm Left arm   Pulse:   75 80   Resp:  16 18 18   Temp:  98 4 °F (36 9 °C) 98 6 °F (37 °C) 98 8 °F (37 1 °C)   TempSrc:  Oral Oral Oral   SpO2:    99%   Weight:       Height:             Intake/Output Summary (Last 24 hours) at 2021 0738  Last data filed at 2021 0330  Gross per 24 hour   Intake --   Output 1822 ml   Net -1822 ml       Lab Results   Component Value Date    WBC 12 29 (H) 09/15/2021    HGB 9 4 (L) 09/15/2021    HCT 29 2 (L) 09/15/2021    MCV 83 09/15/2021     09/15/2021       Physical Exam:     Gen: NAD, nontoxic appearing  CV: RRR, no murmurs  Lungs: CTA, no crackles, rales or wheezes BILAT, normal respiratory effort  Abd: Soft, non-tender, non-distended, no rebound or guarding  Uterine fundus firm and non-tender, 2 cm below the umbilicus  Back: small area of fluctuance at epidural injection site with mild TTP, no step offs throughout spine, no paraspinal tenderness    Ext: Non tender, no swelling BILAT  Neuro: A&O x3          Alesia Galindo MD  Emergency Medicine, PGY-1  9/17/2021  7:38 AM

## 2021-09-17 NOTE — LACTATION NOTE
This note was copied from a baby's chart  CONSULT - LACTATION  Baby Girl Viktoriya Dove 1 days female MRN: [id number]    [facility] NURSERY Room / Bed: (N)/(N) Encounter: [id number]    Maternal Information     MOTHER:  Clarissa Tao  Maternal Age: 24 y o    OB History: # 1 - Date: 21, Sex: Female, Weight: 3285 g (7 lb 3 9 oz), GA: 37w2d, Delivery: Vaginal, Spontaneous, Apgar1: 9, Apgar5: 9, Living: Living, Birth Comments: None   Previouse breast reduction surgery? No    Lactation history:   Has patient previously breast fed: No   How long had patient previously breast fed:     Previous breast feeding complications:       Past Surgical History:   Procedure Laterality Date    WISDOM TOOTH EXTRACTION      2019        Birth information:  YOB: 2021   Time of birth: 9:23 PM   Sex: female   Delivery type: Vaginal, Spontaneous   Birth Weight: 3285 g (7 lb 3 9 oz)   Percent of Weight Change: 0%     Gestational Age: 42w2d   [unfilled]    Assessment     Breast and nipple assessment: normal assessment     Assessment: normal assessment    Feeding assessment: feeding well, per Africa Mcgovern  LATCH:  Latch: Audible Swallowing:    Type of Nipple:    Comfort (Breast/Nipple):    Hold (Positioning):    LATCH Score:         Feeding recommendations:  breast feed on demand     Met with Africa Mcgovern who states that baby has been latching well so far  Nipple assessment showed no trauma ( cracking, blisters, bleeding, redness )  Provided Africa Mcgovern with the Ready, Set, Baby Booklet  And all questions answered  Discussed Skin to Skin contact and benefits to mom and baby  Feeding on cue and what that means for recognizing infant's hunger  Avoidance of pacifiers for the first month discussed  Talked about exclusive breastfeeding for the first 6 months  Positioning and latch reviewed as well as showing images of other feeding positions    Discussed the properties of a good latch in any position  Reviewed hand/manual expression  Gave information on common concerns, what to expect the first few weeks after delivery, preparing for other caregivers, and how partners can help  Resources for support also provided  Encouraged Eric Gayle to call with additional questions and concerns and for latch assessment/assistance as needed      Sheila Edwards RN 9/17/2021 3:40 PM

## 2021-09-18 VITALS
OXYGEN SATURATION: 97 % | SYSTOLIC BLOOD PRESSURE: 118 MMHG | DIASTOLIC BLOOD PRESSURE: 79 MMHG | HEART RATE: 75 BPM | TEMPERATURE: 98.2 F | RESPIRATION RATE: 18 BRPM | WEIGHT: 243 LBS | HEIGHT: 62 IN | BODY MASS INDEX: 44.72 KG/M2

## 2021-09-18 PROCEDURE — 90715 TDAP VACCINE 7 YRS/> IM: CPT | Performed by: OBSTETRICS & GYNECOLOGY

## 2021-09-18 PROCEDURE — 99024 POSTOP FOLLOW-UP VISIT: CPT | Performed by: STUDENT IN AN ORGANIZED HEALTH CARE EDUCATION/TRAINING PROGRAM

## 2021-09-18 RX ORDER — ACETAMINOPHEN AND CODEINE PHOSPHATE 120; 12 MG/5ML; MG/5ML
1 SOLUTION ORAL DAILY
Qty: 28 TABLET | Refills: 0 | Status: SHIPPED | OUTPATIENT
Start: 2021-10-18 | End: 2022-03-03

## 2021-09-18 RX ORDER — ACETAMINOPHEN 325 MG/1
650 TABLET ORAL EVERY 4 HOURS PRN
Qty: 30 TABLET | Refills: 0 | Status: SHIPPED | OUTPATIENT
Start: 2021-09-18 | End: 2022-03-03

## 2021-09-18 RX ORDER — IBUPROFEN 600 MG/1
600 TABLET ORAL EVERY 6 HOURS PRN
Qty: 30 TABLET | Refills: 0 | Status: SHIPPED | OUTPATIENT
Start: 2021-09-18 | End: 2022-03-03

## 2021-09-18 RX ADMIN — TETANUS TOXOID, REDUCED DIPHTHERIA TOXOID AND ACELLULAR PERTUSSIS VACCINE, ADSORBED 0.5 ML: 5; 2.5; 8; 8; 2.5 SUSPENSION INTRAMUSCULAR at 12:16

## 2021-09-18 RX ADMIN — DOCUSATE SODIUM 100 MG: 100 CAPSULE ORAL at 09:38

## 2021-09-18 RX ADMIN — IBUPROFEN 600 MG: 600 TABLET ORAL at 04:21

## 2021-09-18 NOTE — CASE MANAGEMENT
Breast pump consult: [place] order entered for Spectra S2 pump for room delivery via [employer]  Pump delivered by SW to facilitate discharge  No additional needs noted

## 2021-09-18 NOTE — PLAN OF CARE
Problem: PAIN - ADULT  Goal: Verbalizes/displays adequate comfort level or baseline comfort level  Description: Interventions:  - Encourage patient to monitor pain and request assistance  - Assess pain using appropriate pain scale  - Administer analgesics based on type and severity of pain and evaluate response  - Implement non-pharmacological measures as appropriate and evaluate response  - Consider cultural and social influences on pain and pain management  - Notify physician/advanced practitioner if interventions unsuccessful or patient reports new pain  Outcome: Progressing     Problem: INFECTION - ADULT  Goal: Absence or prevention of progression during hospitalization  Description: INTERVENTIONS:  - Assess and monitor for signs and symptoms of infection  - Monitor lab/diagnostic results  - Monitor all insertion sites, i e  indwelling lines, tubes, and drains  - Monitor endotracheal if appropriate and nasal secretions for changes in amount and color  - [place] appropriate cooling/warming therapies per order  - Administer medications as ordered  - Instruct and encourage patient and family to use good hand hygiene technique  - Identify and instruct in appropriate isolation precautions for identified infection/condition  Outcome: Progressing  Goal: Absence of fever/infection during neutropenic period  Description: INTERVENTIONS:  - Monitor WBC    Outcome: Progressing     Problem: SAFETY ADULT  Goal: Patient will remain free of falls  Description: INTERVENTIONS:  - Educate patient/family on patient safety including physical limitations  - Instruct patient to call for assistance with activity   - Consult OT/PT to assist with strengthening/mobility   - Keep Call bell within reach  - Keep bed low and locked with side rails adjusted as appropriate  - Keep care items and personal belongings within reach  - Initiate and maintain comfort rounds  - Make Fall Risk Sign visible to staff  - Offer Toileting every  Hours, in advance of need  - Initiate/Maintain alarm  - Obtain necessary fall risk management equipment:   - Apply yellow socks and bracelet for high fall risk patients  - Consider moving patient to room near nurses station  Outcome: Progressing  Goal: Maintain or return to baseline ADL function  Description: INTERVENTIONS:  -  Assess patient's ability to carry out ADLs; assess patient's baseline for ADL function and identify physical deficits which impact ability to perform ADLs (bathing, care of mouth/teeth, toileting, grooming, dressing, etc )  - Assess/evaluate cause of self-care deficits   - Assess range of motion  - Assess patient's mobility; develop plan if impaired  - Assess patient's need for assistive devices and provide as appropriate  - Encourage maximum independence but intervene and supervise when necessary  - Involve family in performance of ADLs  - Assess for home care needs following discharge   - Consider OT consult to assist with ADL evaluation and planning for discharge  - Provide patient education as appropriate  Outcome: Progressing  Goal: Maintains/Returns to pre admission functional level  Description: INTERVENTIONS:  - Perform BMAT or MOVE assessment daily    - Set and communicate daily mobility goal to care team and patient/family/caregiver  - Collaborate with rehabilitation services on mobility goals if consulted  - Perform Range of Motion  times a day  - Reposition patient every  hours    - Dangle patient  times a day  - Stand patient  times a day  - Ambulate patient  times a day  - Out of bed to chair  times a day   - Out of bed for meals  times a day  - Out of bed for toileting  - Record patient progress and toleration of activity level   Outcome: Progressing     Problem: DISCHARGE PLANNING  Goal: Discharge to home or other facility with appropriate resources  Description: INTERVENTIONS:  - Identify barriers to discharge w/patient and caregiver  - Arrange for needed discharge resources and transportation as appropriate  - Identify discharge learning needs (meds, wound care, etc )  - Arrange for interpretive services to assist at discharge as needed  - Refer to Case Management Department for coordinating discharge planning if the patient needs post-hospital services based on physician/advanced practitioner order or complex needs related to functional status, cognitive ability, or social support system  Outcome: Progressing     Problem: POSTPARTUM  Goal: Experiences normal postpartum course  Description: INTERVENTIONS:  - Monitor maternal vital signs  - Assess uterine involution and lochia  Outcome: Progressing  Goal: Appropriate maternal -  bonding  Description: INTERVENTIONS:  - Identify family support  - Assess for appropriate maternal/infant bonding   -Encourage maternal/infant bonding opportunities  - Referral to  or  as needed  Outcome: Progressing  Goal: Establishment of infant feeding pattern  Description: INTERVENTIONS:  - Assess breast/bottle feeding  - Refer to lactation as needed  Outcome: Progressing  Goal: Incision(s), wounds(s) or drain site(s) healing without S/S of infection  Description: INTERVENTIONS  - Assess and document dressing, incision, wound bed, drain sites and surrounding tissue  - Provide patient and family education  - Perform skin care/dressing changes every   Outcome: Progressing

## 2021-09-18 NOTE — PROGRESS NOTES
Progress Note - OB/GYN   Jim Zapien 24 y o  female MRN: [id number]  Unit/Bed#: -01 Encounter: [id number]    Assessment:  PPD#2 s/p Spontaneous Vaginal Delivery, stable    Plan:    1) Postpartum care  - Encourage ambulation  - Encourage breastfeeding  - Continue current meds     2) Contraception   - Would like Micronor for postpartum contraception    3) Pain Management   - currently well controlled with p o  Analgesics    4) Disposition   Anticipate discharge home today    Subjective/Objective     Subjective:  States she feels well  Pain well controlled  Would like discharge home today  Would like Micronor for birth control  Pain: Yes, well controlled with p o  Analgesics  Tolerating PO: yes  Voiding: yes  Flatus: yes  BM: Yes  Ambulating: yes  Breastfeeding: Breastfeeding  Chest pain: no  Shortness of breath: no  Leg pain: no  Lochia: Minimal    Objective:     Vitals:  Vitals:    09/17/21 0353 09/17/21 0709 09/17/21 1207 09/18/21 0011   BP: 129/79 131/86 112/72 120/73   BP Location: Left arm Left arm Left arm Left arm   Pulse: 75 80 85 81   Resp: 18 18 18 18   Temp: 98 6 °F (37 °C) 98 8 °F (37 1 °C) 98 3 °F (36 8 °C) 98 5 °F (36 9 °C)   TempSrc: Oral Oral Oral Oral   SpO2:  99% 97% 97%   Weight:       Height:           Physical Exam:   GEN: The patient was alert, pleasant well-appearing female in no acute distress     CV: Regular rate  PULM: nonlabored respirations  MSK: Normal gait  Skin: warm, dry  Neuro: no focal deficits  Psych: normal affect and judgement, cooperative  ABDOMEN: soft, mild tenderness, no distention, Uterine fundus firm and non-tender, -2 cm below the umbilicus         Lab Results   Component Value Date    WBC 12 29 (H) 09/15/2021    HGB 9 4 (L) 09/15/2021    HCT 29 2 (L) 09/15/2021    MCV 83 09/15/2021     09/15/2021         William Kenny MD, PGY-1  Obstetrics & Gynecology  09/18/21

## 2021-09-18 NOTE — PLAN OF CARE
Problem: PAIN - ADULT  Goal: Verbalizes/displays adequate comfort level or baseline comfort level  Description: Interventions:  - Encourage patient to monitor pain and request assistance  - Assess pain using appropriate pain scale  - Administer analgesics based on type and severity of pain and evaluate response  - Implement non-pharmacological measures as appropriate and evaluate response  - Consider cultural and social influences on pain and pain management  - Notify physician/advanced practitioner if interventions unsuccessful or patient reports new pain  Outcome: Adequate for Discharge     Problem: INFECTION - ADULT  Goal: Absence or prevention of progression during hospitalization  Description: INTERVENTIONS:  - Assess and monitor for signs and symptoms of infection  - Monitor lab/diagnostic results  - Monitor all insertion sites, i e  indwelling lines, tubes, and drains  - Monitor endotracheal if appropriate and nasal secretions for changes in amount and color  - [place] appropriate cooling/warming therapies per order  - Administer medications as ordered  - Instruct and encourage patient and family to use good hand hygiene technique  - Identify and instruct in appropriate isolation precautions for identified infection/condition  Outcome: Adequate for Discharge  Goal: Absence of fever/infection during neutropenic period  Description: INTERVENTIONS:  - Monitor WBC    Outcome: Adequate for Discharge     Problem: SAFETY ADULT  Goal: Patient will remain free of falls  Description: INTERVENTIONS:  - Educate patient/family on patient safety including physical limitations  - Instruct patient to call for assistance with activity   - Consult OT/PT to assist with strengthening/mobility   - Keep Call bell within reach  - Keep bed low and locked with side rails adjusted as appropriate  - Keep care items and personal belongings within reach  - Initiate and maintain comfort rounds  - Make Fall Risk Sign visible to staff  - Offer Toileting every  Hours, in advance of need  - Initiate/Maintain alarm  - Obtain necessary fall risk management equipment:   - Apply yellow socks and bracelet for high fall risk patients  - Consider moving patient to room near nurses station  Outcome: Adequate for Discharge  Goal: Maintain or return to baseline ADL function  Description: INTERVENTIONS:  -  Assess patient's ability to carry out ADLs; assess patient's baseline for ADL function and identify physical deficits which impact ability to perform ADLs (bathing, care of mouth/teeth, toileting, grooming, dressing, etc )  - Assess/evaluate cause of self-care deficits   - Assess range of motion  - Assess patient's mobility; develop plan if impaired  - Assess patient's need for assistive devices and provide as appropriate  - Encourage maximum independence but intervene and supervise when necessary  - Involve family in performance of ADLs  - Assess for home care needs following discharge   - Consider OT consult to assist with ADL evaluation and planning for discharge  - Provide patient education as appropriate  Outcome: Adequate for Discharge  Goal: Maintains/Returns to pre admission functional level  Description: INTERVENTIONS:  - Perform BMAT or MOVE assessment daily    - Set and communicate daily mobility goal to care team and patient/family/caregiver  - Collaborate with rehabilitation services on mobility goals if consulted  - Perform Range of Motion  times a day  - Reposition patient every  hours    - Dangle patient  times a day  - Stand patient  times a day  - Ambulate patient  times a day  - Out of bed to chair  times a day   - Out of bed for meals  times a day  - Out of bed for toileting  - Record patient progress and toleration of activity level   Outcome: Adequate for Discharge     Problem: DISCHARGE PLANNING  Goal: Discharge to home or other facility with appropriate resources  Description: INTERVENTIONS:  - Identify barriers to discharge w/patient and caregiver  - Arrange for needed discharge resources and transportation as appropriate  - Identify discharge learning needs (meds, wound care, etc )  - Arrange for interpretive services to assist at discharge as needed  - Refer to Case Management Department for coordinating discharge planning if the patient needs post-hospital services based on physician/advanced practitioner order or complex needs related to functional status, cognitive ability, or social support system  Outcome: Adequate for Discharge     Problem: POSTPARTUM  Goal: Experiences normal postpartum course  Description: INTERVENTIONS:  - Monitor maternal vital signs  - Assess uterine involution and lochia  Outcome: Adequate for Discharge  Goal: Appropriate maternal -  bonding  Description: INTERVENTIONS:  - Identify family support  - Assess for appropriate maternal/infant bonding   -Encourage maternal/infant bonding opportunities  - Referral to  or  as needed  Outcome: Adequate for Discharge  Goal: Establishment of infant feeding pattern  Description: INTERVENTIONS:  - Assess breast/bottle feeding  - Refer to lactation as needed  Outcome: Adequate for Discharge  Goal: Incision(s), wounds(s) or drain site(s) healing without S/S of infection  Description: INTERVENTIONS  - Assess and document dressing, incision, wound bed, drain sites and surrounding tissue  - Provide patient and family education  - Perform skin care/dressing changes every  Outcome: Adequate for Discharge

## 2021-09-18 NOTE — LACTATION NOTE
This note was copied from a baby's chart  Met with mother to go over discharge breastfeeding booklet including the feeding log  Emphasized 8 or more (12) feedings in a 24 hour period, what to expect for the number of diapers per day of life and the progression of properties of the  stooling pattern  Reviewed breastfeeding and your lifestyle, storage and preparation of breast milk, how to keep you breast pump clean, the employed breastfeeding mother and paced bottle feeding handouts  Booklet included Breastfeeding Resources for after discharge including access to the number for the [address]  Discussed s/s engorgement and how to manage with medications, additional feedings at the breast or pumping sessions as needed, and cool compresses as well as s/s and management of mastitis and when to contact physician  Assisted Mom to place baby in football hold  Discussed importance of alignment of baby's ear, shoulder, and hip in any preferred position  Worked on supporting baby at breast level and beginning the feed with baby's nose arriving at the nipple  Then, using areolar compression while guiding baby chin-forward to the breast to achieve a deep, comfortable latch  Baby latches properly at this time, Mom reports some latch on tenderness that quickly resolves  Enc her to contact [employer] [facility] as needed for support after DC  Discussed introduction of solid foods in conjunction with breastfeeding at 6 mo

## 2021-09-18 NOTE — CASE MANAGEMENT
SW received c/b from [facility]  C&Y worker Xochilt Contreras [phone]  Advised that C&Y worker will follow up with family in the home  No additional concerns for discharge

## 2021-09-20 NOTE — UTILIZATION REVIEW
Notification of Discharge   This is a Notification of Discharge from our facility [address]  Please be advised that this patient has been discharge from our facility  Below you will find the admission and discharge date and time including the patients disposition  UTILIZATION REVIEW CONTACT:  Brittney Zendejas  Utilization   Network Utilization Review Department  Phone: [phone] x carefully listen to the prompts  All voicemails are confidential   Email: [email or web address]     PHYSICIAN ADVISORY SERVICES:  FOR NWVG-MY-HHNY REVIEW - MEDICAL NECESSITY DENIAL  Phone: [phone]  Fax: [phone]  Email: [email or web address]     PRESENTATION DATE: 9/15/2021 10:02 AM  OBERVATION ADMISSION DATE:   INPATIENT ADMISSION DATE: 9/15/21  4:03 PM   DISCHARGE DATE: 9/18/2021  2:34 PM  DISPOSITION: Home/Self Care Home/Self Care      IMPORTANT INFORMATION:  Send all requests for admission clinical reviews, approved or denied determinations and any other requests to dedicated fax number below belonging to the campus where the patient is receiving treatment   List of dedicated fax numbers:  [address] DENIALS (Administrative/Medical Necessity) [phone]   [address] (Maternity/NICU/Pediatrics) [phone]   Elfida Lips [phone]   Kimberly Nazario [phone]   [facility] [phone]   [facility] [address] [facility] [phone]   [facility]  [phone]   [address] [facility] [address]  [address] [facility] [address] [facility] [phone]

## 2021-10-04 LAB
DME PARACHUTE DELIVERY DATE ACTUAL: NORMAL
DME PARACHUTE DELIVERY DATE REQUESTED: NORMAL
DME PARACHUTE DELIVERY NOTE: NORMAL
DME PARACHUTE ITEM DESCRIPTION: NORMAL
DME PARACHUTE ORDER STATUS: NORMAL
DME PARACHUTE SUPPLIER NAME: NORMAL
DME PARACHUTE SUPPLIER PHONE: NORMAL

## 2022-02-19 ENCOUNTER — NURSE TRIAGE (OUTPATIENT)
Dept: OTHER | Facility: OTHER | Age: 22
End: 2022-02-19

## 2022-02-19 ENCOUNTER — HOSPITAL ENCOUNTER (EMERGENCY)
Facility: HOSPITAL | Age: 22
Discharge: HOME | End: 2022-02-20
Attending: EMERGENCY MEDICINE | Admitting: EMERGENCY MEDICINE
Payer: COMMERCIAL

## 2022-02-19 DIAGNOSIS — O21.0 HYPEREMESIS GRAVIDARUM: Primary | ICD-10-CM

## 2022-02-19 DIAGNOSIS — E87.6 HYPOKALEMIA: ICD-10-CM

## 2022-02-19 DIAGNOSIS — O21.9 NAUSEA AND VOMITING DURING PREGNANCY: ICD-10-CM

## 2022-02-19 LAB
ANION GAP SERPL CALCULATED.3IONS-SCNC: 10 MMOL/L (ref 4–13)
BACTERIA UR QL AUTO: ABNORMAL /HPF
BILIRUB UR QL STRIP: ABNORMAL
BUN SERPL-MCNC: 11 MG/DL (ref 5–25)
CALCIUM SERPL-MCNC: 9.9 MG/DL (ref 8.3–10.1)
CHLORIDE SERPL-SCNC: 101 MMOL/L (ref 100–108)
CLARITY UR: CLEAR
CO2 SERPL-SCNC: 24 MMOL/L (ref 21–32)
COLOR UR: ABNORMAL
CREAT SERPL-MCNC: 0.54 MG/DL (ref 0.6–1.3)
EXT PREG TEST URINE: POSITIVE
EXT. CONTROL ED NAV: ABNORMAL
GFR SERPL CREATININE-BSD FRML MDRD: 134 ML/MIN/1.73SQ M
GLUCOSE SERPL-MCNC: 83 MG/DL (ref 65–140)
GLUCOSE UR STRIP-MCNC: NEGATIVE MG/DL
HGB UR QL STRIP.AUTO: NEGATIVE
KETONES UR STRIP-MCNC: ABNORMAL MG/DL
LEUKOCYTE ESTERASE UR QL STRIP: NEGATIVE
MUCOUS THREADS UR QL AUTO: ABNORMAL
NITRITE UR QL STRIP: NEGATIVE
NON-SQ EPI CELLS URNS QL MICRO: ABNORMAL /HPF
PH UR STRIP.AUTO: 6 [PH] (ref 4.5–8)
POTASSIUM SERPL-SCNC: 3 MMOL/L (ref 3.5–5.3)
PROT UR STRIP-MCNC: ABNORMAL MG/DL
RBC #/AREA URNS AUTO: ABNORMAL /HPF
SODIUM SERPL-SCNC: 135 MMOL/L (ref 136–145)
SP GR UR STRIP.AUTO: >=1.03 (ref 1–1.03)
UROBILINOGEN UR QL STRIP.AUTO: 0.2 E.U./DL
WBC #/AREA URNS AUTO: ABNORMAL /HPF

## 2022-02-19 PROCEDURE — 36415 COLL VENOUS BLD VENIPUNCTURE: CPT | Performed by: EMERGENCY MEDICINE

## 2022-02-19 PROCEDURE — 99284 EMERGENCY DEPT VISIT MOD MDM: CPT | Performed by: EMERGENCY MEDICINE

## 2022-02-19 PROCEDURE — 81025 URINE PREGNANCY TEST: CPT | Performed by: EMERGENCY MEDICINE

## 2022-02-19 PROCEDURE — 96361 HYDRATE IV INFUSION ADD-ON: CPT

## 2022-02-19 PROCEDURE — 99284 EMERGENCY DEPT VISIT MOD MDM: CPT

## 2022-02-19 PROCEDURE — 80048 BASIC METABOLIC PNL TOTAL CA: CPT | Performed by: EMERGENCY MEDICINE

## 2022-02-19 PROCEDURE — 96366 THER/PROPH/DIAG IV INF ADDON: CPT

## 2022-02-19 PROCEDURE — 76815 OB US LIMITED FETUS(S): CPT | Performed by: EMERGENCY MEDICINE

## 2022-02-19 PROCEDURE — 81001 URINALYSIS AUTO W/SCOPE: CPT

## 2022-02-19 PROCEDURE — 96375 TX/PRO/DX INJ NEW DRUG ADDON: CPT

## 2022-02-19 PROCEDURE — 96365 THER/PROPH/DIAG IV INF INIT: CPT

## 2022-02-19 RX ORDER — ONDANSETRON 2 MG/ML
4 INJECTION INTRAMUSCULAR; INTRAVENOUS ONCE
Status: COMPLETED | OUTPATIENT
Start: 2022-02-19 | End: 2022-02-19

## 2022-02-19 RX ADMIN — ONDANSETRON 4 MG: 2 INJECTION INTRAMUSCULAR; INTRAVENOUS at 20:24

## 2022-02-19 RX ADMIN — DEXTROSE AND SODIUM CHLORIDE 500 ML: 5; .9 INJECTION, SOLUTION INTRAVENOUS at 21:23

## 2022-02-19 RX ADMIN — SODIUM CHLORIDE 1000 ML: 0.9 INJECTION, SOLUTION INTRAVENOUS at 20:26

## 2022-02-19 NOTE — TELEPHONE ENCOUNTER
Regarding: Pregnant unable to keep anything down   ----- Message from Silvia Au sent at 2/19/2022  6:36 PM EST -----  '' I just found out that I'm pregnant and I unable to keep anything down ''

## 2022-02-20 VITALS
HEART RATE: 84 BPM | DIASTOLIC BLOOD PRESSURE: 66 MMHG | OXYGEN SATURATION: 98 % | TEMPERATURE: 99.1 F | SYSTOLIC BLOOD PRESSURE: 125 MMHG | RESPIRATION RATE: 16 BRPM

## 2022-02-20 LAB
BACTERIA UR QL AUTO: ABNORMAL /HPF
BILIRUB UR QL STRIP: ABNORMAL
CLARITY UR: CLEAR
COLOR UR: YELLOW
GLUCOSE UR STRIP-MCNC: NEGATIVE MG/DL
HGB UR QL STRIP.AUTO: NEGATIVE
KETONES UR STRIP-MCNC: ABNORMAL MG/DL
LEUKOCYTE ESTERASE UR QL STRIP: NEGATIVE
MUCOUS THREADS UR QL AUTO: ABNORMAL
NITRITE UR QL STRIP: NEGATIVE
NON-SQ EPI CELLS URNS QL MICRO: ABNORMAL /HPF
PH UR STRIP.AUTO: 6.5 [PH] (ref 4.5–8)
PROT UR STRIP-MCNC: ABNORMAL MG/DL
RBC #/AREA URNS AUTO: ABNORMAL /HPF
SP GR UR STRIP.AUTO: >=1.03 (ref 1–1.03)
UROBILINOGEN UR QL STRIP.AUTO: 1 E.U./DL
WBC #/AREA URNS AUTO: ABNORMAL /HPF

## 2022-02-20 PROCEDURE — 81001 URINALYSIS AUTO W/SCOPE: CPT

## 2022-02-20 RX ORDER — ONDANSETRON 4 MG/1
4 TABLET, ORALLY DISINTEGRATING ORAL EVERY 6 HOURS PRN
Qty: 20 TABLET | Refills: 0 | Status: SHIPPED | OUTPATIENT
Start: 2022-02-20

## 2022-02-20 RX ORDER — ONDANSETRON 4 MG/1
4 TABLET, ORALLY DISINTEGRATING ORAL ONCE
Status: COMPLETED | OUTPATIENT
Start: 2022-02-20 | End: 2022-02-20

## 2022-02-20 RX ADMIN — ONDANSETRON 4 MG: 4 TABLET, ORALLY DISINTEGRATING ORAL at 00:15

## 2022-02-20 NOTE — ED ATTENDING ATTESTATION
2022  Dipak Mckenna DO, saw and evaluated the patient  I have discussed the patient with the resident/non-physician practitioner and agree with the resident's/non-physician practitioner's findings, Plan of Care, and MDM as documented in the resident's/non-physician practitioner's note, except where noted  All available labs and Radiology studies were reviewed  I was present for key portions of any procedure(s) performed by the resident/non-physician practitioner and I was immediately available to provide assistance  At this point I agree with the current assessment done in the Emergency Department  I have conducted an independent evaluation of this patient a history and physical is as follows:    24 yo female  @ approx 7wk5d EGA based on LMP 21  She is currently breastfeeding her 5mo old  She presents for evaluation of intractable n/v (NBNB)  No abd pain, vag dc or bleeding, f/c/n/v  No other c/o at this time  Previous pregnancy complicated by severe n/v as well, but didn't need IV hydration in the hospital     abd sntnd no r/g bs+    Imp: n/v in pregnancy  Plan: IVF, UA for ketones, antiemetics        ED Course         Critical Care Time  Procedures

## 2022-02-20 NOTE — DISCHARGE INSTRUCTIONS
Prescription for nausea medication was sent to your pharmacy, take this as needed and increase your fluid intake  Your potassium was found to be low  Please have it rechecked with your primary care physician or OBGYN  Follow up with your OBGYN and your primary care physician  Return to the ED if symptoms worsen

## 2022-02-20 NOTE — TELEPHONE ENCOUNTER
Reason for Disposition   [1] SEVERE vomiting (e g , 6 or more times/day) AND [2] present > 8 hours    Answer Assessment - Initial Assessment Questions  1  VOMITING SEVERITY: "How many times have you vomited in the past 24 hours?"      - MILD:  1 - 2 times/day     - MODERATE: 3 - 5 times/day, decreased oral intake without significant weight loss or symptoms of dehydration     - SEVERE: 6 or more times/day, vomits everything or nearly everything, with significant weight loss, symptoms of dehydration       "I can't remember, it's been a lot"    2  ONSET: "When did the vomiting begin?"       Wednesday    3  FLUIDS: "What fluids or food have you vomited up today?" "Are you able to keep any liquids down?"      Can keep small sips of water down    4  TREATMENT: "What have you been doing so far to treat this?"       Nothing    5  DEHYDRATION: "When was the last time you urinated?" "Are you feeling lightheaded?" "Weight loss?"      Urinated 2-3 hours ago  Is feeling dizzy  6  PREGNANCY: "How many weeks pregnant are you?" "How has the pregnancy been going?"      Unknown, LMP 12/28/2021    7  VALENTÍN: "What date are you expecting to deliver?"      Unknown    8  MEDICATIONS: "What medications are you taking?" (e g , prenatal vitamins, iron)      Denies    9  OTHER SYMPTOMS: "Do you have any other symptoms?"      heartburn    Is breast feeding 11 month old baby      Protocols used: PREGNANCY - MORNING SICKNESS (NAUSEA AND VOMITING OF PREGNANCY)-ADULT-

## 2022-02-20 NOTE — ED PROVIDER NOTES
History  Chief Complaint   Patient presents with    Vomiting During Pregnancy     Vomiting, unable to keep food down x5 days; states feels dizzy; LMP Dec 2021; is currently breastfeeding     HPI    24 yo F, , presenting for evaluation of nausea and vomiting x 4 days  Since Wednesday, patient has had trouble tolerating PO  Emesis is nonbloody, nonbilious  Admits to soreness in the abdomen from vomiting but denies abdominal pain, pelvic pain, vaginal cramping, vaginal bleeding, dysuria, urinary frequency  Patient had a positive at home pregnancy test on 2/15/22  Last known menstrual period was 22  Pain patient's prior pregnancy, she had issues with hyperemesis gravidarum, needed multiple ED visits for IV hydration, did not require admission  Denies fever, chills, chest pain, shortness of breath, headache  Patient has not had Ob evaluation for this pregnancy yet but has an appointment scheduled  Prior to Admission Medications   Prescriptions Last Dose Informant Patient Reported? Taking?    Prenatal Vit-Fe Fumarate-FA (PRENATAL VITAMIN PO)  Self Yes No   Sig: Take by mouth   acetaminophen (TYLENOL) 325 mg tablet   No No   Sig: Take 2 tablets (650 mg total) by mouth every 4 (four) hours as needed for mild pain   ferrous sulfate 325 (65 Fe) mg tablet  Self Yes No   Sig: Take 325 mg by mouth daily with breakfast   ibuprofen (MOTRIN) 600 mg tablet   No No   Sig: Take 1 tablet (600 mg total) by mouth every 6 (six) hours as needed for moderate pain   norethindrone (MICRONOR) 0 35 MG tablet   No No   Sig: Take 1 tablet (0 35 mg total) by mouth daily for 28 days Do not start until at least 4 weeks postpartum      Facility-Administered Medications: None       Past Medical History:   Diagnosis Date    Anemia     Anxiety     Depression     no meds since age 12    Varicella     had vaccines       Past Surgical History:   Procedure Laterality Date    WISDOM TOOTH EXTRACTION      2019       Family History Problem Relation Age of Onset    No Known Problems Mother     Diabetes Paternal Grandmother     Breast cancer Maternal Aunt     No Known Problems Father      I have reviewed and agree with the history as documented  E-Cigarette/Vaping    E-Cigarette Use Former User     Comments at age 21-23      E-Cigarette/Vaping Substances    Nicotine Yes     THC No     CBD Yes     Flavoring Yes     Other No     Unknown No      Social History     Tobacco Use    Smoking status: Never Smoker    Smokeless tobacco: Never Used   Vaping Use    Vaping Use: Former    Substances: Nicotine, CBD, Flavoring   Substance Use Topics    Alcohol use: No     Comment: none with pregnancy    Drug use: Yes     Types: Marijuana     Comment: last use 1 month ago        Review of Systems   Constitutional: Negative for chills and fever  HENT: Negative for sore throat  Respiratory: Negative for cough and shortness of breath  Cardiovascular: Negative for chest pain, palpitations and leg swelling  Gastrointestinal: Positive for nausea and vomiting  Negative for abdominal pain and diarrhea  Genitourinary: Positive for decreased urine volume  Negative for dysuria, frequency, pelvic pain, vaginal bleeding and vaginal discharge  Musculoskeletal: Negative for myalgias  Skin: Negative for rash and wound  Neurological: Negative for dizziness, light-headedness and headaches  All other systems reviewed and are negative        Physical Exam  ED Triage Vitals   Temperature Pulse Respirations Blood Pressure SpO2   02/19/22 1955 02/19/22 1955 02/19/22 1955 02/19/22 1957 02/19/22 1955   99 1 °F (37 3 °C) 97 18 110/63 98 %      Temp Source Heart Rate Source Patient Position - Orthostatic VS BP Location FiO2 (%)   02/19/22 1955 02/20/22 0011 02/20/22 0011 02/20/22 0011 --   Oral Monitor Sitting Right arm       Pain Score       02/20/22 0011       No Pain             Orthostatic Vital Signs  Vitals:    02/19/22 1955 02/19/22 1957 02/20/22 0011   BP:  110/63 125/66   Pulse: 97  84   Patient Position - Orthostatic VS:   Sitting       Physical Exam  Vitals and nursing note reviewed  Exam conducted with a chaperone present  Constitutional:       General: She is not in acute distress  Appearance: Normal appearance  She is not ill-appearing or toxic-appearing  HENT:      Head: Normocephalic and atraumatic  Right Ear: External ear normal       Left Ear: External ear normal       Nose: Nose normal       Mouth/Throat:      Mouth: Mucous membranes are dry  Pharynx: Oropharynx is clear  No oropharyngeal exudate or posterior oropharyngeal erythema  Eyes:      General: No scleral icterus  Extraocular Movements: Extraocular movements intact  Cardiovascular:      Rate and Rhythm: Normal rate and regular rhythm  Pulses: Normal pulses  Pulmonary:      Effort: Pulmonary effort is normal  No respiratory distress  Breath sounds: Normal breath sounds  Abdominal:      General: Abdomen is flat  Palpations: Abdomen is soft  Tenderness: There is no abdominal tenderness  There is no guarding or rebound  Musculoskeletal:         General: Normal range of motion  Cervical back: Normal range of motion and neck supple  Skin:     General: Skin is warm  Capillary Refill: Capillary refill takes less than 2 seconds  Neurological:      General: No focal deficit present  Mental Status: She is alert and oriented to person, place, and time           ED Medications  Medications   sodium chloride 0 9 % bolus 1,000 mL (0 mL Intravenous Stopped 2/19/22 2126)   ondansetron (ZOFRAN) injection 4 mg (4 mg Intravenous Given 2/19/22 2024)   dextrose 5 % and sodium chloride 0 9 % bolus 500 mL (0 mL Intravenous Stopped 2/19/22 2323)   ondansetron (ZOFRAN-ODT) dispersible tablet 4 mg (4 mg Oral Given 2/20/22 0015)       Diagnostic Studies  Results Reviewed     Procedure Component Value Units Date/Time    Urine Microscopic [[id number]]  (Abnormal) Collected: 02/20/22 0004    Lab Status: Final result Specimen: Urine Updated: 02/20/22 0103     RBC, UA None Seen /hpf      WBC, UA 4-10 /hpf      Epithelial Cells Moderate /hpf      Bacteria, UA Moderate /hpf      MUCUS THREADS Moderate    Urine Macroscopic, POC [[id number]]  (Abnormal) Collected: 02/20/22 0004    Lab Status: Final result Specimen: Urine Updated: 02/20/22 0005     Color, UA Yellow     Clarity, UA Clear     pH, UA 6 5     Leukocytes, UA Negative     Nitrite, UA Negative     Protein,  (2+) mg/dl      Glucose, UA Negative mg/dl      Ketones, UA >=160 (4+) mg/dl      Urobilinogen, UA 1 0 E U /dl      Bilirubin, UA Interference- unable to analyze     Blood, UA Negative     Specific Gravity, UA >=1 030    Narrative:      CLINITEK RESULT    Urine Microscopic [[id number]]  (Abnormal) Collected: 02/19/22 2058    Lab Status: Final result Specimen: Urine, Clean Catch Updated: 02/19/22 2152     RBC, UA None Seen /hpf      WBC, UA None Seen /hpf      Epithelial Cells Occasional /hpf      Bacteria, UA None Seen /hpf      MUCUS THREADS Occasional    POCT pregnancy, urine [[id number]]  (Abnormal) Resulted: 02/19/22 2109    Lab Status: Final result Updated: 02/19/22 2110     EXT PREG TEST UR (Ref: Negative) Positive     Control Valid    Urine Macroscopic, POC [[id number]]  (Abnormal) Collected: 02/19/22 2058    Lab Status: Final result Specimen: Urine Updated: 02/19/22 2059     Color, UA Stephanie     Clarity, UA Clear     pH, UA 6 0     Leukocytes, UA Negative     Nitrite, UA Negative     Protein,  (2+) mg/dl      Glucose, UA Negative mg/dl      Ketones, UA >=160 (4+) mg/dl      Urobilinogen, UA 0 2 E U /dl      Bilirubin, UA Interference- unable to analyze     Blood, UA Negative     Specific Gravity, UA >=1 030    Narrative:      CLINITEK RESULT    Basic metabolic panel [[id number]]  (Abnormal) Collected: 02/19/22 2020    Lab Status: Final result Specimen: Blood from Arm, Left Updated: 02/19/22 2054     Sodium 135 mmol/L      Potassium 3 0 mmol/L      Chloride 101 mmol/L      CO2 24 mmol/L      ANION GAP 10 mmol/L      BUN 11 mg/dL      Creatinine 0 54 mg/dL      Glucose 83 mg/dL      Calcium 9 9 mg/dL      eGFR 134 ml/min/1 73sq m     Narrative:      [place] guidelines for Chronic Kidney Disease (CKD):     Stage 1 with normal or high GFR (GFR > 90 mL/min/1 73 square meters)    Stage 2 Mild CKD (GFR = 60-89 mL/min/1 73 square meters)    Stage 3A Moderate CKD (GFR = 45-59 mL/min/1 73 square meters)    Stage 3B Moderate CKD (GFR = 30-44 mL/min/1 73 square meters)    Stage 4 Severe CKD (GFR = 15-29 mL/min/1 73 square meters)    Stage 5 End Stage CKD (GFR <15 mL/min/1 73 square meters)  Note: GFR calculation is accurate only with a steady state creatinine                 No orders to display         Procedures  POC Pelvic US    Date/Time: 2/19/2022 8:52 PM  Performed by: Meliza Richardson DO  Authorized by: Meliza Richardson DO     Patient location:  ED  Other Assisting Provider: Yes (comment) (Dr Jose Sanchez)    Procedure details:     Exam Type:  Diagnostic    Indications: evaluate for IUP      Assessment for: confirm intrauterine pregnancy      Technique:  Transabdominal obstetric (HCG+) exam and transvaginal obstetric (HCG+) exam    Views obtained: uterus (transverse and sagittal)      Image quality: diagnostic      Image availability:  Images available in PACS  Uterine findings:     Intrauterine pregnancy: identified      Single gestation: identified      Gestational sac: identified      Yolk sac: identified      Fetal pole: identified      Fetal heart rate: identified      Fetal heart rate (bpm):  158  Interpretation:     Ultrasound impressions: normal      Pregnancy findings: intrauterine pregnancy (IUP)            ED Course  ED Course as of 02/20/22 1509   Sat Feb 19, 2022 2119 PREGNANCY TEST URINE: Positive   2119 Ketones, UA(!): >=160 (4+)  D5NS ordered   Sun 2022   000 Patient reports symptomatic improvement following treatment  I discussed admission vs further observation in the ED vs discharge  Patient states that she feels great and would like to go home  MDM  Number of Diagnoses or Management Options  Hyperemesis gravidarum  Hypokalemia  Nausea and vomiting during pregnancy  Diagnosis management comments: 71-year-old female, , approximately 7 weeks pregnant by dates, presenting for hyperemesis gravidarum  Patient is dry appearing but otherwise appears well  Will check BMP and urine for ketones  Will start with normal saline, Zofran, if patient's urine shows significant ketonuria will D5 containing IV fluids  Will do ultrasound to evaluate for intrauterine pregnancy  Patient's UA shows significant ketonuria, patient's fluids switch to D5 normal saline  Transabdominal pelvic ultrasound performed by myself with assistance from Dr Latonya Taylor, gestational sac visualized with questionable fetal pole visualized  I discussed the results with the patient, offered a transvaginal pelvic ultrasound, patient agrees to transvaginal pelvic ultrasound  Transvaginal pelvic ultrasound performed by myself and Dr Latonya Taylor, gestational sac visualized, fetal pole visualized with fetal heart rate of 158 beats per minute  Patient states that she feels much better on re-evaluation, is tolerating fluids and solids p o  Repeat urinalysis still shows ketonuria  I discussed the risks and benefits of admission versus further treatment in the emergency department versus outpatient management with the patient  Shared decision making, patient agrees to outpatient management  Will discharge with Zofran, OB follow-up, return to ED precautions  I reviewed all testing with the patient: UA, BMP, TAUS and TV US  I gave oral return precautions for what to return for in addition to the written return precautions     The patient (and any family present: ) verbalized understanding of the discharge instructions and warnings that would necessitate return to the Emergency Department  I specifically highlighted areas of special concern regarding the written and verbal discharge instructions and return precautions  All questions were answered prior to discharge  Disposition  Final diagnoses:   Hyperemesis gravidarum   Nausea and vomiting during pregnancy   Hypokalemia     Time reflects when diagnosis was documented in both MDM as applicable and the Disposition within this note     Time User Action Codes Description Comment    2/20/2022 12:10 AM Karyn Corleyner Add [O21 0] Hyperemesis gravidarum     2/20/2022 12:10 AM Coram Kimmie Add [O21 9] Nausea and vomiting during pregnancy     2/20/2022 12:10 AM Karyn Kimmie Add [E87 6] Hypokalemia       ED Disposition     ED Disposition Condition Date/Time Comment    Discharge Stable Sun Feb 20, 2022 12:11 AM Rosalba Cavanaugh discharge to home/self care              Follow-up Information     Follow up With Specialties Details Why Contact Info Additional Information    Liana Pavon MD [facility] Medicine  For Emergency Department Follow-up [address] [facility]  [place] [address]  [phone]       [address] [place] [address] Emergency Department Emergency Medicine  If symptoms worsen [address]  [address] [facility] 64 [facility] Emergency Department, [address], [facility] 108    Rachel Rodriguez MD Maternal and Fetal Medicine, Obstetrics, Obstetrics and Gynecology, Gynecology  For Emergency Department Follow-up [address] [facility]  [address]  [address]  [phone]             Discharge Medication List as of 2/20/2022 12:12 AM      START taking these medications    Details   ondansetron (Zofran ODT) 4 mg disintegrating tablet Take 1 tablet (4 mg total) by mouth every 6 (six) hours as needed for nausea or vomiting, Starting Sun 2/20/2022, Normal         CONTINUE these medications which have NOT CHANGED    Details   acetaminophen (TYLENOL) 325 mg tablet Take 2 tablets (650 mg total) by mouth every 4 (four) hours as needed for mild pain, Starting Sat 9/18/2021, Normal      ferrous sulfate 325 (65 Fe) mg tablet Take 325 mg by mouth daily with breakfast, Historical Med      ibuprofen (MOTRIN) 600 mg tablet Take 1 tablet (600 mg total) by mouth every 6 (six) hours as needed for moderate pain, Starting Sat 9/18/2021, Normal      norethindrone (MICRONOR) 0 35 MG tablet Take 1 tablet (0 35 mg total) by mouth daily for 28 days Do not start until at least 4 weeks postpartum, Starting Mon 10/18/2021, Until Mon 11/15/2021, Normal      Prenatal Vit-Fe Fumarate-FA (PRENATAL VITAMIN PO) Take by mouth, Historical Med           No discharge procedures on file  PDMP Review     None           ED Provider  Attending physically available and evaluated Marla Flores  BRET managed the patient along with the ED Attending      Electronically Signed by         Moy Espino DO  02/20/22 [id number] [facility],   02/20/22 6880

## 2022-02-21 ENCOUNTER — TELEPHONE (OUTPATIENT)
Dept: OBGYN CLINIC | Facility: CLINIC | Age: 22
End: 2022-02-21

## 2022-02-21 ENCOUNTER — HOSPITAL ENCOUNTER (EMERGENCY)
Facility: HOSPITAL | Age: 22
Discharge: HOME | End: 2022-02-21
Attending: EMERGENCY MEDICINE | Admitting: EMERGENCY MEDICINE
Payer: COMMERCIAL

## 2022-02-21 VITALS
BODY MASS INDEX: 39.68 KG/M2 | TEMPERATURE: 98.6 F | OXYGEN SATURATION: 100 % | RESPIRATION RATE: 20 BRPM | HEART RATE: 70 BPM | WEIGHT: 216.93 LBS | DIASTOLIC BLOOD PRESSURE: 64 MMHG | SYSTOLIC BLOOD PRESSURE: 105 MMHG

## 2022-02-21 DIAGNOSIS — E87.6 HYPOKALEMIA: ICD-10-CM

## 2022-02-21 DIAGNOSIS — O21.9 VOMITING DURING PREGNANCY: Primary | ICD-10-CM

## 2022-02-21 LAB
ALBUMIN SERPL BCP-MCNC: 3.9 G/DL (ref 3.5–5)
ALP SERPL-CCNC: 65 U/L (ref 46–116)
ALT SERPL W P-5'-P-CCNC: 17 U/L (ref 12–78)
ANION GAP SERPL CALCULATED.3IONS-SCNC: 16 MMOL/L (ref 4–13)
AST SERPL W P-5'-P-CCNC: 13 U/L (ref 5–45)
BACTERIA UR QL AUTO: ABNORMAL /HPF
BASOPHILS # BLD AUTO: 0.03 THOUSANDS/ΜL (ref 0–0.1)
BASOPHILS NFR BLD AUTO: 0 % (ref 0–1)
BILIRUB SERPL-MCNC: 0.87 MG/DL (ref 0.2–1)
BILIRUB UR QL STRIP: ABNORMAL
BUN SERPL-MCNC: 9 MG/DL (ref 5–25)
CALCIUM SERPL-MCNC: 8.9 MG/DL (ref 8.3–10.1)
CHLORIDE SERPL-SCNC: 100 MMOL/L (ref 100–108)
CLARITY UR: CLEAR
CO2 SERPL-SCNC: 24 MMOL/L (ref 21–32)
COLOR UR: YELLOW
CREAT SERPL-MCNC: 0.55 MG/DL (ref 0.6–1.3)
EOSINOPHIL # BLD AUTO: 0.08 THOUSAND/ΜL (ref 0–0.61)
EOSINOPHIL NFR BLD AUTO: 1 % (ref 0–6)
ERYTHROCYTE [DISTWIDTH] IN BLOOD BY AUTOMATED COUNT: 12.5 % (ref 11.6–15.1)
EXT PREG TEST URINE: ABNORMAL
EXT. CONTROL ED NAV: ABNORMAL
GFR SERPL CREATININE-BSD FRML MDRD: 133 ML/MIN/1.73SQ M
GLUCOSE SERPL-MCNC: 79 MG/DL (ref 65–140)
GLUCOSE UR STRIP-MCNC: NEGATIVE MG/DL
HCT VFR BLD AUTO: 36.3 % (ref 34.8–46.1)
HGB BLD-MCNC: 12.6 G/DL (ref 11.5–15.4)
HGB UR QL STRIP.AUTO: ABNORMAL
IMM GRANULOCYTES # BLD AUTO: 0.08 THOUSAND/UL (ref 0–0.2)
IMM GRANULOCYTES NFR BLD AUTO: 1 % (ref 0–2)
KETONES UR STRIP-MCNC: ABNORMAL MG/DL
LEUKOCYTE ESTERASE UR QL STRIP: NEGATIVE
LYMPHOCYTES # BLD AUTO: 2.4 THOUSANDS/ΜL (ref 0.6–4.47)
LYMPHOCYTES NFR BLD AUTO: 17 % (ref 14–44)
MCH RBC QN AUTO: 30.1 PG (ref 26.8–34.3)
MCHC RBC AUTO-ENTMCNC: 34.7 G/DL (ref 31.4–37.4)
MCV RBC AUTO: 87 FL (ref 82–98)
MONOCYTES # BLD AUTO: 0.82 THOUSAND/ΜL (ref 0.17–1.22)
MONOCYTES NFR BLD AUTO: 6 % (ref 4–12)
MUCOUS THREADS UR QL AUTO: ABNORMAL
NEUTROPHILS # BLD AUTO: 10.56 THOUSANDS/ΜL (ref 1.85–7.62)
NEUTS SEG NFR BLD AUTO: 75 % (ref 43–75)
NITRITE UR QL STRIP: NEGATIVE
NON-SQ EPI CELLS URNS QL MICRO: ABNORMAL /HPF
NRBC BLD AUTO-RTO: 0 /100 WBCS
PH UR STRIP.AUTO: 6.5 [PH] (ref 4.5–8)
PLATELET # BLD AUTO: 402 THOUSANDS/UL (ref 149–390)
PMV BLD AUTO: 9.2 FL (ref 8.9–12.7)
POTASSIUM SERPL-SCNC: 3.2 MMOL/L (ref 3.5–5.3)
PROT SERPL-MCNC: 8 G/DL (ref 6.4–8.2)
PROT UR STRIP-MCNC: ABNORMAL MG/DL
RBC # BLD AUTO: 4.19 MILLION/UL (ref 3.81–5.12)
RBC #/AREA URNS AUTO: ABNORMAL /HPF
SODIUM SERPL-SCNC: 140 MMOL/L (ref 136–145)
SP GR UR STRIP.AUTO: >=1.03 (ref 1–1.03)
UROBILINOGEN UR QL STRIP.AUTO: 0.2 E.U./DL
WBC # BLD AUTO: 13.97 THOUSAND/UL (ref 4.31–10.16)
WBC #/AREA URNS AUTO: ABNORMAL /HPF

## 2022-02-21 PROCEDURE — 81025 URINE PREGNANCY TEST: CPT | Performed by: PHYSICIAN ASSISTANT

## 2022-02-21 PROCEDURE — 36415 COLL VENOUS BLD VENIPUNCTURE: CPT | Performed by: PHYSICIAN ASSISTANT

## 2022-02-21 PROCEDURE — 96374 THER/PROPH/DIAG INJ IV PUSH: CPT

## 2022-02-21 PROCEDURE — 99283 EMERGENCY DEPT VISIT LOW MDM: CPT

## 2022-02-21 PROCEDURE — 99284 EMERGENCY DEPT VISIT MOD MDM: CPT | Performed by: PHYSICIAN ASSISTANT

## 2022-02-21 PROCEDURE — 96361 HYDRATE IV INFUSION ADD-ON: CPT

## 2022-02-21 PROCEDURE — 80053 COMPREHEN METABOLIC PANEL: CPT | Performed by: PHYSICIAN ASSISTANT

## 2022-02-21 PROCEDURE — 85025 COMPLETE CBC W/AUTO DIFF WBC: CPT | Performed by: PHYSICIAN ASSISTANT

## 2022-02-21 PROCEDURE — 81001 URINALYSIS AUTO W/SCOPE: CPT

## 2022-02-21 RX ORDER — METOCLOPRAMIDE 10 MG/1
10 TABLET ORAL EVERY 6 HOURS PRN
Qty: 30 TABLET | Refills: 0 | Status: SHIPPED | OUTPATIENT
Start: 2022-02-21

## 2022-02-21 RX ORDER — POTASSIUM CHLORIDE 20 MEQ/1
40 TABLET, EXTENDED RELEASE ORAL ONCE
Status: COMPLETED | OUTPATIENT
Start: 2022-02-21 | End: 2022-02-21

## 2022-02-21 RX ORDER — ONDANSETRON 2 MG/ML
4 INJECTION INTRAMUSCULAR; INTRAVENOUS ONCE
Status: COMPLETED | OUTPATIENT
Start: 2022-02-21 | End: 2022-02-21

## 2022-02-21 RX ADMIN — POTASSIUM CHLORIDE 40 MEQ: 1500 TABLET, EXTENDED RELEASE ORAL at 11:26

## 2022-02-21 RX ADMIN — ONDANSETRON 4 MG: 2 INJECTION INTRAMUSCULAR; INTRAVENOUS at 10:05

## 2022-02-21 RX ADMIN — SODIUM CHLORIDE 1000 ML: 0.9 INJECTION, SOLUTION INTRAVENOUS at 12:28

## 2022-02-21 RX ADMIN — SODIUM CHLORIDE 1000 ML: 0.9 INJECTION, SOLUTION INTRAVENOUS at 10:03

## 2022-02-21 NOTE — ED PROVIDER NOTES
History  Chief Complaint   Patient presents with    Vomiting     6-7 weeks pregnant  non stop vomiting since last night  Patient is a 15-year-old female,  at approximately 7 weeks pregnant based on LMP of 21, presents emergency department for evaluation of nausea and vomiting  Patient states she has had multiple episodes of nonbloody, nonbilious vomiting since finding out she was pregnant on 02/15/2022  Patient was seen at [facility] for same, was given IV fluids Zofran and had pelvic ultrasound completed which showed single live IUP with fetal origins of 60 beats per minute  Patient states she felt improved after leaving this visit, but soon after began with continued nausea and vomiting  Patient states she was vomiting every hour last night, now feels weak and fatigued  Patient called on-call OB who referred patient to emergency department  Patient without fever, abdominal pain, pelvic pain, vaginal bleeding, leakage of fluid, urinary symptoms, and diarrhea, constipation, flank pain  Prior to Admission Medications   Prescriptions Last Dose Informant Patient Reported? Taking?    Prenatal Vit-Fe Fumarate-FA (PRENATAL VITAMIN PO)  Self Yes Yes   Sig: Take by mouth   acetaminophen (TYLENOL) 325 mg tablet Not Taking at Unknown time  No No   Sig: Take 2 tablets (650 mg total) by mouth every 4 (four) hours as needed for mild pain   Patient not taking: Reported on 2022    ferrous sulfate 325 (65 Fe) mg tablet Not Taking at Unknown time Self Yes No   Sig: Take 325 mg by mouth daily with breakfast   Patient not taking: Reported on 2022    ibuprofen (MOTRIN) 600 mg tablet Not Taking at Unknown time  No No   Sig: Take 1 tablet (600 mg total) by mouth every 6 (six) hours as needed for moderate pain   Patient not taking: Reported on 2022    norethindrone (MICRONOR) 0 35 MG tablet   No No   Sig: Take 1 tablet (0 35 mg total) by mouth daily for 28 days Do not start until at least 4 weeks postpartum   ondansetron (Zofran ODT) 4 mg disintegrating tablet Not Taking at Unknown time  No No   Sig: Take 1 tablet (4 mg total) by mouth every 6 (six) hours as needed for nausea or vomiting   Patient not taking: Reported on 2/21/2022       Facility-Administered Medications: None       Past Medical History:   Diagnosis Date    Anemia     Anxiety     Depression     no meds since age 12    Varicella     had vaccines       Past Surgical History:   Procedure Laterality Date    WISDOM TOOTH EXTRACTION      1/2019       Family History   Problem Relation Age of Onset    No Known Problems Mother     Diabetes Paternal Grandmother     Breast cancer Maternal Aunt     No Known Problems Father      I have reviewed and agree with the history as documented  E-Cigarette/Vaping    E-Cigarette Use Former User     Comments at age 21-23      E-Cigarette/Vaping Substances    Nicotine Yes     THC No     CBD Yes     Flavoring Yes     Other No     Unknown No      Social History     Tobacco Use    Smoking status: Never Smoker    Smokeless tobacco: Never Used   Vaping Use    Vaping Use: Former    Substances: Nicotine, CBD, Flavoring   Substance Use Topics    Alcohol use: No     Comment: none with pregnancy    Drug use: Not Currently     Types: Marijuana     Comment: last use 1 month ago       Review of Systems   Constitutional: Positive for fatigue  Negative for chills and fever  HENT: Negative for ear pain and sore throat  Eyes: Negative for visual disturbance  Respiratory: Negative for cough and shortness of breath  Cardiovascular: Negative for chest pain, palpitations and leg swelling  Gastrointestinal: Positive for nausea and vomiting  Negative for abdominal pain and diarrhea  Genitourinary: Negative for dysuria, flank pain, hematuria, pelvic pain, vaginal bleeding and vaginal discharge  Musculoskeletal: Negative for back pain and neck pain     Skin: Negative for rash  Neurological: Positive for weakness  Negative for speech difficulty and headaches  Psychiatric/Behavioral: Negative for confusion  Physical Exam  Physical Exam  Constitutional:       Appearance: Normal appearance  HENT:      Head: Normocephalic and atraumatic  Right Ear: External ear normal       Left Ear: External ear normal       Nose: Nose normal       Mouth/Throat:      Lips: Pink  Mouth: Mucous membranes are moist    Eyes:      Extraocular Movements: Extraocular movements intact  Conjunctiva/sclera: Conjunctivae normal    Pulmonary:      Effort: No tachypnea or respiratory distress  Musculoskeletal:      Cervical back: Normal range of motion and neck supple  Skin:     General: Skin is warm  Capillary Refill: Capillary refill takes less than 2 seconds  Neurological:      Mental Status: She is alert and oriented to person, place, and time  GCS: GCS eye subscore is 4  GCS verbal subscore is 5  GCS motor subscore is 6     Psychiatric:         Mood and Affect: Mood and affect normal          Speech: Speech normal          Vital Signs  ED Triage Vitals [02/21/22 0921]   Temperature Pulse Respirations Blood Pressure SpO2   98 6 °F (37 °C) 86 17 127/68 98 %      Temp Source Heart Rate Source Patient Position - Orthostatic VS BP Location FiO2 (%)   Oral Monitor Sitting Right arm --      Pain Score       --           Vitals:    02/21/22 0921 02/21/22 1125 02/21/22 1330   BP: 127/68 135/65 105/64   Pulse: 86 62 70   Patient Position - Orthostatic VS: Sitting           Visual Acuity      ED Medications  Medications   sodium chloride 0 9 % bolus 1,000 mL (0 mL Intravenous Stopped 2/21/22 1229)   ondansetron (ZOFRAN) injection 4 mg (4 mg Intravenous Given 2/21/22 1005)   potassium chloride (K-DUR,KLOR-CON) CR tablet 40 mEq (40 mEq Oral Given 2/21/22 1126)   sodium chloride 0 9 % bolus 1,000 mL (0 mL Intravenous Stopped 2/21/22 1513)       Diagnostic Studies  Results Reviewed     Procedure Component Value Units Date/Time    Urine Microscopic [[id number]]  (Abnormal) Collected: 02/21/22 1029    Lab Status: Final result Specimen: Urine, Clean Catch Updated: 02/21/22 1145     RBC, UA 0-1 /hpf      WBC, UA 2-4 /hpf      Epithelial Cells Occasional /hpf      Bacteria, UA Moderate /hpf      MUCUS THREADS Innumerable    Comprehensive metabolic panel [[id number]]  (Abnormal) Collected: 02/21/22 1002    Lab Status: Final result Specimen: Blood from Arm, Left Updated: 02/21/22 1057     Sodium 140 mmol/L      Potassium 3 2 mmol/L      Chloride 100 mmol/L      CO2 24 mmol/L      ANION GAP 16 mmol/L      BUN 9 mg/dL      Creatinine 0 55 mg/dL      Glucose 79 mg/dL      Calcium 8 9 mg/dL      AST 13 U/L      ALT 17 U/L      Alkaline Phosphatase 65 U/L      Total Protein 8 0 g/dL      Albumin 3 9 g/dL      Total Bilirubin 0 87 mg/dL      eGFR 133 ml/min/1 73sq m     Narrative:      [place] guidelines for Chronic Kidney Disease (CKD):     Stage 1 with normal or high GFR (GFR > 90 mL/min/1 73 square meters)    Stage 2 Mild CKD (GFR = 60-89 mL/min/1 73 square meters)    Stage 3A Moderate CKD (GFR = 45-59 mL/min/1 73 square meters)    Stage 3B Moderate CKD (GFR = 30-44 mL/min/1 73 square meters)    Stage 4 Severe CKD (GFR = 15-29 mL/min/1 73 square meters)    Stage 5 End Stage CKD (GFR <15 mL/min/1 73 square meters)  Note: GFR calculation is accurate only with a steady state creatinine    POCT pregnancy, urine [[id number]]  (Abnormal) Resulted: 02/21/22 1032    Lab Status: Final result Updated: 02/21/22 1032     EXT PREG TEST UR (Ref: Negative) POSS ( + )     Control Valid    CBC and differential [[id number]]  (Abnormal) Collected: 02/21/22 1002    Lab Status: Final result Specimen: Blood from Arm, Left Updated: 02/21/22 1031     WBC 13 97 Thousand/uL      RBC 4 19 Million/uL      Hemoglobin 12 6 g/dL      Hematocrit 36 3 %      MCV 87 fL      MCH 30 1 pg      MCHC 34 7 g/dL      RDW 12 5 %      MPV 9 2 fL      Platelets 453 Thousands/uL      nRBC 0 /100 WBCs      Neutrophils Relative 75 %      Immat GRANS % 1 %      Lymphocytes Relative 17 %      Monocytes Relative 6 %      Eosinophils Relative 1 %      Basophils Relative 0 %      Neutrophils Absolute 10 56 Thousands/µL      Immature Grans Absolute 0 08 Thousand/uL      Lymphocytes Absolute 2 40 Thousands/µL      Monocytes Absolute 0 82 Thousand/µL      Eosinophils Absolute 0 08 Thousand/µL      Basophils Absolute 0 03 Thousands/µL     Urine Macroscopic, POC [[id number]]  (Abnormal) Collected: 02/21/22 1029    Lab Status: Final result Specimen: Urine Updated: 02/21/22 1031     Color, UA Yellow     Clarity, UA Clear     pH, UA 6 5     Leukocytes, UA Negative     Nitrite, UA Negative     Protein, UA 30 (1+) mg/dl      Glucose, UA Negative mg/dl      Ketones, UA >=160 (4+) mg/dl      Urobilinogen, UA 0 2 E U /dl      Bilirubin, UA Interference- unable to analyze     Blood, UA Trace     Specific Gravity, UA >=1 030    Narrative:      CLINITEK RESULT                 No orders to display              Procedures  Procedures         ED Course  ED Course as of 02/21/22 1550   Mon Feb 21, 2022   1032 Ketones, UA(!): >=160 (4+)   1137 Patient feeling improved, will PO challenge    1244 TT to OBGYN   1428 Still waiting for text back from OB   1449 OB reviewed ultrasound images from 2/19, live IUP, patient tolerating PO, can follow-up with her OB as outpatient                               SBIRT 22yo+      Most Recent Value   SBIRT (25 yo +)    In order to provide better care to our patients, we are screening all of our patients for alcohol and drug use  Would it be okay to ask you these screening questions?  No Filed at: 02/21/2022 0112                    MDM  Number of Diagnoses or Management Options  Hypokalemia: established and improving  Vomiting during pregnancy: established and improving  Diagnosis management comments: Patient is a 80-year-old female,  at approximately 7 weeks pregnant based on LMP of 21, presents emergency department for evaluation of nausea and vomiting  Reviewed ultrasound from [facility] on  - live IUP noted with FHT - this was obtained via transvaginal U/S  Patient denies any abdominal pain or vaginal bleeding or leakage of fluid  Patient is planning to schedule her viability scan by her OB after she leaves here  Will obtain labs to assess for dehydration and electrolyte imbalance  Hypokalemia noted, will replete  IVF and zofran given, patient tolerating PO  Patient did not  zofran from pharmacy yet, advised her to do this to take at home, she states reglan has helped her in the past, will give her rx for reglan as well  Recommend she f/u with her OB, return to the ED if sx worsen or change    Patient verbalizes understanding and agrees with plan  The management plan was discussed in detail with the patient at bedside and all questions were answered  Prior to discharge, I provided both verbal and written instructions  I discussed with the patient the signs and symptoms for which to return to the emergency department  All questions were answered and patient was comfortable with the plan of care and discharged to home  The patient agrees to return to the Emergency Department for concerns and/or progression of illness  Disposition  Final diagnoses:   Vomiting during pregnancy   Hypokalemia     Time reflects when diagnosis was documented in both MDM as applicable and the Disposition within this note     Time User Action Codes Description Comment    2022  2:56 PM Triny Currie Add [O21 9] Vomiting during pregnancy     2022  2:57 PM Triny Currie Add [E87 6] Hypokalemia       ED Disposition     ED Disposition Condition Date/Time Comment    Discharge Stable Mon 2022  2:56 PM Shannon Reynoso discharge to home/self care              Follow-up Information     Follow up With Specialties Details Why Contact Info    OBGYN              Discharge Medication List as of 2/21/2022  2:58 PM      START taking these medications    Details   metoclopramide (Reglan) 10 mg tablet Take 1 tablet (10 mg total) by mouth every 6 (six) hours as needed (nausea/vomiting), Starting Mon 2/21/2022, Normal         CONTINUE these medications which have NOT CHANGED    Details   Prenatal Vit-Fe Fumarate-FA (PRENATAL VITAMIN PO) Take by mouth, Historical Med      acetaminophen (TYLENOL) 325 mg tablet Take 2 tablets (650 mg total) by mouth every 4 (four) hours as needed for mild pain, Starting Sat 9/18/2021, Normal      ferrous sulfate 325 (65 Fe) mg tablet Take 325 mg by mouth daily with breakfast, Historical Med      ibuprofen (MOTRIN) 600 mg tablet Take 1 tablet (600 mg total) by mouth every 6 (six) hours as needed for moderate pain, Starting Sat 9/18/2021, Normal      norethindrone (MICRONOR) 0 35 MG tablet Take 1 tablet (0 35 mg total) by mouth daily for 28 days Do not start until at least 4 weeks postpartum, Starting Mon 10/18/2021, Until Mon 11/15/2021, Normal      ondansetron (Zofran ODT) 4 mg disintegrating tablet Take 1 tablet (4 mg total) by mouth every 6 (six) hours as needed for nausea or vomiting, Starting Sun 2/20/2022, Normal             No discharge procedures on file      PDMP Review     None          ED Provider  Electronically Signed by           Paulette Velasquez PA-C  02/21/22 0947

## 2022-02-21 NOTE — TELEPHONE ENCOUNTER
Pt called to inform she found out she was pregnant last week  She is about 7w6d, estimating her lmp was dec 28-29   has not made an early US appt with us yet  she is our pt, del 9/17/21  not seen since  ended up going to the ER over the weekend due to vomiting  she said she was told she is still dehydrated after given fluids  was ok up until last night- up every hr vomiting, says she feels very weak still and wants to know if she should go back to ER  I told her I would TT our on call provider  Per TT OLIVA:   Yes to early us  Can send in oral treatment if she wants to try otherwise yet to er       While on hold, pt informed she vomited again  Told her we can call her back  Pt called us back and asked if she can go back to ER because she feels weak  Told pt that is fine, relayed to her what [facility] said  Told pt to go to ER and to call us back once she takes care of that so we can schedule her Early US appt  She verbalized understanding to all  Also confirmed she has someone to take her

## 2022-03-03 PROBLEM — O99.213 OBESITY AFFECTING PREGNANCY IN THIRD TRIMESTER: Status: RESOLVED | Noted: 2021-03-23 | Resolved: 2022-03-03

## 2022-03-03 PROBLEM — A74.9 CHLAMYDIA INFECTION AFFECTING PREGNANCY IN FIRST TRIMESTER: Status: RESOLVED | Noted: 2021-04-22 | Resolved: 2022-03-03

## 2022-03-03 PROBLEM — O36.80X1 ENCOUNTER TO DETERMINE FETAL VIABILITY OF PREGNANCY, FETUS 1: Status: RESOLVED | Noted: 2022-03-03 | Resolved: 2022-03-03

## 2022-03-03 PROBLEM — R82.71 GBS BACTERIURIA: Status: RESOLVED | Noted: 2021-03-25 | Resolved: 2022-03-03

## 2022-03-03 PROBLEM — N91.1 SECONDARY AMENORRHEA: Status: ACTIVE | Noted: 2022-03-03

## 2022-03-03 PROBLEM — O98.811 CHLAMYDIA INFECTION AFFECTING PREGNANCY IN FIRST TRIMESTER: Status: RESOLVED | Noted: 2021-04-22 | Resolved: 2022-03-03

## 2022-03-03 PROBLEM — Z34.03 ENCOUNTER FOR SUPERVISION OF NORMAL FIRST PREGNANCY IN THIRD TRIMESTER: Status: RESOLVED | Noted: 2021-03-23 | Resolved: 2022-03-03

## 2022-03-03 PROBLEM — O36.80X1 ENCOUNTER TO DETERMINE FETAL VIABILITY OF PREGNANCY, FETUS 1: Status: ACTIVE | Noted: 2022-03-03

## 2022-03-03 PROBLEM — O36.80X0 ENCOUNTER TO DETERMINE FETAL VIABILITY OF PREGNANCY: Status: ACTIVE | Noted: 2022-03-03

## 2023-03-01 ENCOUNTER — OFFICE VISIT (OUTPATIENT)
Dept: FAMILY MEDICINE CLINIC | Facility: CLINIC | Age: 23
End: 2023-03-01

## 2023-03-01 VITALS
WEIGHT: 225 LBS | BODY MASS INDEX: 41.41 KG/M2 | TEMPERATURE: 97.9 F | OXYGEN SATURATION: 98 % | RESPIRATION RATE: 16 BRPM | DIASTOLIC BLOOD PRESSURE: 68 MMHG | HEIGHT: 62 IN | HEART RATE: 70 BPM | SYSTOLIC BLOOD PRESSURE: 124 MMHG

## 2023-03-01 DIAGNOSIS — H01.001 BLEPHARITIS OF RIGHT UPPER EYELID, UNSPECIFIED TYPE: Primary | ICD-10-CM

## 2023-03-01 RX ORDER — DIAPER,BRIEF,INFANT-TODD,DISP
EACH MISCELLANEOUS 4 TIMES DAILY PRN
COMMUNITY
Start: 2022-10-05 | End: 2023-03-01

## 2023-03-01 RX ORDER — IBUPROFEN 600 MG/1
600 TABLET ORAL 4 TIMES DAILY
COMMUNITY
Start: 2022-10-05 | End: 2023-03-01

## 2023-03-01 RX ORDER — ASCORBIC ACID 250 MG
250 TABLET ORAL
COMMUNITY
Start: 2022-10-07 | End: 2023-03-01

## 2023-03-01 RX ORDER — ERYTHROMYCIN 5 MG/G
0.5 OINTMENT OPHTHALMIC EVERY 6 HOURS SCHEDULED
Qty: 30 G | Refills: 0 | Status: SHIPPED | OUTPATIENT
Start: 2023-03-01 | End: 2023-03-08

## 2023-03-01 RX ORDER — ERYTHROMYCIN 5 MG/G
0.5 OINTMENT OPHTHALMIC EVERY 12 HOURS SCHEDULED
Qty: 14 G | Refills: 0 | Status: SHIPPED | OUTPATIENT
Start: 2023-03-01 | End: 2023-03-01 | Stop reason: SDUPTHER

## 2023-03-01 RX ORDER — IRON POLYSACCHARIDE COMPLEX 150 MG
150 CAPSULE ORAL 2 TIMES DAILY
COMMUNITY
Start: 2022-08-23 | End: 2023-03-01

## 2023-03-01 NOTE — PROGRESS NOTES
Name: Fiorella Timmons      : 2000      MRN: [id number]  Encounter Provider: LIVIA Aceves  Encounter Date: 3/1/2023   Encounter department: [facility]     1  Blepharitis of right upper eyelid, unspecified type  -     erythromycin (ILOTYCIN) ophthalmic ointment; Administer 0 5 inches to the right eye every 6 (six) hours for 7 days        Patient reports that her right eye has been itchy and irritated since   Patient reports that her right eyelids have been red and swollen since yesterday  Patient also reports yellow eye drainage  Denies any fever, eyeball pain, or vision changes  Right upper eyelid is red and swollen  Discussion on blepharitis and treatment  Patient is currently breastfeeding  Erythromycin ointment prescribed  Medication information and side effects reviewed  Patient instructed to follow-up with an eye specialist if she develops any eyeball pain or vision changes  Patient instructed to follow-up with an eye specialist if her symptoms do not improve or get worse in the next 24-48 hours  Subjective      Patient reports that her right eye has been itchy and irritated since   Patient reports that her right eyelids have been red and swollen since yesterday  Denies any fever  Patient reports yellow eye drainage  Denies any vision changes or eyeball pain  Patient denies taking anything OTC  Patient reports that her symptoms are not going away  Review of Systems   Constitutional: Negative for chills and fever  HENT: Negative for congestion, ear pain and sore throat  Eyes:        As noted in HPI  Respiratory: Negative for cough, chest tightness, shortness of breath and wheezing  Cardiovascular: Negative for chest pain  Gastrointestinal: Negative for abdominal pain, diarrhea, nausea and vomiting  Skin: Negative for rash     Neurological: Negative for dizziness, syncope, light-headedness and headaches  Current Outpatient Medications on File Prior to Visit   Medication Sig   • Prenatal Vit-Fe Fumarate-FA (PRENATAL VITAMIN PO) Take by mouth (Patient not taking: Reported on 3/1/2023)   • [DISCONTINUED] ascorbic acid (VITAMIN C) 250 MG tablet Take 250 mg by mouth (Patient not taking: Reported on 3/1/2023)   • [DISCONTINUED] hydrocortisone 1 % cream Apply topically 4 (four) times a day as needed (Patient not taking: Reported on 3/1/2023)   • [DISCONTINUED] ibuprofen (MOTRIN) 600 mg tablet Take 600 mg by mouth 4 (four) times a day (Patient not taking: Reported on 3/1/2023)   • [DISCONTINUED] iron polysaccharides (FERREX) 150 mg capsule Take 150 mg by mouth 2 (two) times a day (Patient not taking: Reported on 3/1/2023)   • [DISCONTINUED] metoclopramide (Reglan) 10 mg tablet Take 1 tablet (10 mg total) by mouth every 6 (six) hours as needed (nausea/vomiting) (Patient not taking: Reported on 3/1/2023)   • [DISCONTINUED] ondansetron (Zofran ODT) 4 mg disintegrating tablet Take 1 tablet (4 mg total) by mouth every 6 (six) hours as needed for nausea or vomiting (Patient not taking: Reported on 2/21/2022)       Objective     /68   Pulse 70   Temp 97 9 °F (36 6 °C) (Tympanic)   Resp 16   Ht 5' 2" (1 575 m)   Wt 102 kg (225 lb)   LMP 02/22/2023   SpO2 98%   BMI 41 15 kg/m²     Physical Exam  Vitals reviewed  Constitutional:       General: She is not in acute distress  Appearance: She is not diaphoretic  HENT:      Right Ear: Tympanic membrane, ear canal and external ear normal       Left Ear: Tympanic membrane, ear canal and external ear normal       Nose: No congestion  Mouth/Throat:      Mouth: Mucous membranes are moist       Pharynx: Oropharynx is clear  No posterior oropharyngeal erythema  Eyes:      Pupils: Pupils are equal, round, and reactive to light  Comments: Right upper eyelid is swollen and red      Cardiovascular:      Rate and Rhythm: Normal rate and regular rhythm  Pulses: Normal pulses  Heart sounds: Normal heart sounds  Pulmonary:      Effort: Pulmonary effort is normal  No respiratory distress  Breath sounds: Normal breath sounds  No wheezing  Musculoskeletal:      Comments: Gait wnl  Skin:     Findings: No rash  Neurological:      Mental Status: She is alert and oriented to person, place, and time     Psychiatric:         Mood and Affect: Mood normal        LIVIA Ballard

## 2023-09-20 NOTE — ASSESSMENT & PLAN NOTE
Deep Richterdeepak  is a 24 y o  Bret Littler who presents for early ultrasound  Single IUP @ 8w3d consistent with U/S done 2/9 (8w2d by that U/S)  and not C/W LMP  Use VALENTÍN of 10/5/21  Unplanned but welcome pregnancy  Having N/V -using Zofran and Reglan  Having numbness in right leg  Will schedule OB intake and prenatal one  Encouraged to continue PNV  Discussed avoiding teratogens  Declines Flu vaccine   To notify us with any bleeding or pelvic pain  Verbalized understanding  Azithromycin Pregnancy And Lactation Text: This medication is considered safe during pregnancy and is also secreted in breast milk.

## 2025-04-30 ENCOUNTER — OFFICE VISIT (OUTPATIENT)
Dept: URGENT CARE | Age: 25
End: 2025-04-30
Payer: COMMERCIAL

## 2025-04-30 VITALS
BODY MASS INDEX: 40.53 KG/M2 | SYSTOLIC BLOOD PRESSURE: 120 MMHG | OXYGEN SATURATION: 99 % | DIASTOLIC BLOOD PRESSURE: 78 MMHG | WEIGHT: 221.6 LBS | RESPIRATION RATE: 20 BRPM | TEMPERATURE: 97 F | HEART RATE: 99 BPM

## 2025-04-30 DIAGNOSIS — J01.90 ACUTE SINUSITIS, RECURRENCE NOT SPECIFIED, UNSPECIFIED LOCATION: Primary | ICD-10-CM

## 2025-04-30 DIAGNOSIS — J02.9 SORE THROAT: ICD-10-CM

## 2025-04-30 PROCEDURE — 87880 STREP A ASSAY W/OPTIC: CPT

## 2025-04-30 PROCEDURE — 99213 OFFICE O/P EST LOW 20 MIN: CPT

## 2025-04-30 PROCEDURE — 87070 CULTURE OTHR SPECIMN AEROBIC: CPT

## 2025-04-30 RX ORDER — AMOXICILLIN 500 MG/1
500 CAPSULE ORAL EVERY 12 HOURS SCHEDULED
Qty: 14 CAPSULE | Refills: 0 | Status: SHIPPED | OUTPATIENT
Start: 2025-04-30 | End: 2025-05-07

## 2025-04-30 NOTE — PATIENT INSTRUCTIONS
Rapid strep performed in office found to be negative, throat culture results pending and [facility]  Please begin Amoxicillin for sinusitis.   May alternate Tylenol/ibuprofen as needed for fever.  May use Cepacol lozenges, Chloraseptic throat spray, warm salt water gargles and hot tea with honey as needed for sore throat.  Follow up with primary care provider if symptoms do not resolve within 1-2 weeks.

## 2025-04-30 NOTE — PROGRESS NOTES
[facility]  Name: Mariia Kelley      : 2000      MRN: [id number]  Encounter Provider: LIVIA Muller  Encounter Date: 2025   Encounter department: [facility] [place]  :  Assessment & Plan  Sore throat    Orders:    POCT rapid ANTIGEN strepA    Throat culture; Future    Acute sinusitis, recurrence not specified, unspecified location    Orders:    amoxicillin (AMOXIL) 500 mg capsule; Take 1 capsule (500 mg total) by mouth every 12 (twelve) hours for 7 days        Patient Instructions  Patient Education     Sinusitis, Adult ED   General Information   You came to the Emergency Department (ED) for sinusitis. Your sinuses are hollow areas in the bones of your face. They have a thin lining that normally makes a small amount of mucus. When you have sinusitis, the lining gets swollen and makes extra mucus. You may have sinusitis with or after a cold. Most of the time sinusitis will get better in 1 to 2 weeks.  Sinusitis is most often caused by a virus, so antibiotics won’t help. But some people do need antibiotics. If the doctor ordered antibiotics for you, be sure to follow the instructions. It is important to take all of your antibiotics even if you start to feel better.  What care is needed at home?   Call your regular doctor to let them know you were in the ED. Make a follow-up appointment if you were told to.  Try to thin the mucus.  Drink lots of liquids to stay hydrated.  Use a cool mist humidifier to avoid dry air.  Use saline nose drops or a saline nose rinse to relieve stuffiness.  Wash your hands often. This will help keep others healthy.  Do not smoke or be in smoke-filled places. Avoid things that may cause breathing problems like fumes, pollution, dust, and other common allergens and irritants.  You may want to take medicine like ibuprofen, naproxen, or acetaminophen to help with pain.  When do I need to get emergency help?   Return to the ED if:   You have a stiff neck,  especially if you also have fever, chills, vomiting, or severe headache  You have trouble thinking clearly.  You have trouble seeing or have double vision.  You have swelling or redness or pain around one or both eyes.  You have a fever of 102°F (38.9°C) or higher, or have shaking chills or sweats.  When do I need to call the doctor?   You have an upset stomach and throwing up.  You have more pain in your face and head.  You are not getting better within 1 to 2 weeks.  You have new or worsening symptoms.  Last Reviewed Date   2020-08-03  Consumer Information Use and Disclaimer   This generalized information is a limited summary of diagnosis, treatment, and/or medication information. It is not meant to be comprehensive and should be used as a tool to help the user understand and/or assess potential diagnostic and treatment options. It does NOT include all information about conditions, treatments, medications, side effects, or risks that may apply to a specific patient. It is not intended to be medical advice or a substitute for the medical advice, diagnosis, or treatment of a health care provider based on the health care provider's examination and assessment of a patient’s specific and unique circumstances. Patients must speak with a health care provider for complete information about their health, medical questions, and treatment options, including any risks or benefits regarding use of medications. This information does not endorse any treatments or medications as safe, effective, or approved for treating a specific patient. [employer] and its affiliates disclaim any warranty or liability relating to this information or the use thereof. The use of this information is governed by the Terms of Use, available at https://www.woltersKnomeuwer.com/en/know/clinical-effectiveness-terms   Copyright   Follow up with PCP in 3-5 days.  Proceed to  ER if symptoms worsen.    If tests are performed, our office will contact you  "with results only if changes need to made to the care plan discussed with you at the visit. You can review your full results on [facility].    Chief Complaint:   Chief Complaint   Patient presents with    Cough     Cough, sinus pressure,  and mucus color yellow/ clear since April 10, 25 on/off .     History of Present Illness {?Quick Links Encounters * My Last Note * Last Note in Specialty * Snapshot * Since Last Visit * History :54571}  HPI  {History obtained from(Optional):91991}    Review of Systems  Past Medical History   Past Medical History:   Diagnosis Date    Anemia     Anxiety     Depression     no meds since age 16    Varicella     had vaccines     Past Surgical History:   Procedure Laterality Date    WISDOM TOOTH EXTRACTION      1/2019     Family History   Problem Relation Age of Onset    No Known Problems Mother     Diabetes Paternal Grandmother     Breast cancer Maternal Aunt     No Known Problems Father      she reports that she has never smoked. She has never used smokeless tobacco. She reports that she does not currently use drugs after having used the following drugs: Marijuana. She reports that she does not drink alcohol.  Current Outpatient Medications   Medication Instructions    amoxicillin (AMOXIL) 500 mg, Oral, Every 12 hours scheduled    Prenatal Vit-Fe Fumarate-FA (PRENATAL VITAMIN PO) Take by mouth   No Known Allergies     Objective {?Quick Links Trend Vitals * Enter New Vitals * Results Review * Timeline (Adult) * Labs * Imaging * Cardiology * Procedures * Lung Cancer Screening * Surgical eConsent :84953}  /78   Pulse 99   Temp (!) 97 °F (36.1 °C) (Tympanic)   Resp 20   Wt 101 kg (221 lb 9.6 oz)   LMP 04/11/2025 (Approximate)   SpO2 99%   BMI 40.53 kg/m²      Physical Exam    {Administrative / Billing Section (Optional):56603}Portions of the record may have been created with voice recognition software.  Occasional wrong word or \"sound a like\" substitutions may have " occurred due to the inherent limitations of voice recognition software.  Read the chart carefully and recognize, using context, where substitutions have occurred.   normal. There is no impacted cerumen. Tympanic membrane is not erythematous or bulging.      Left Ear: Hearing, tympanic membrane, ear canal and external ear normal. There is no impacted cerumen. Tympanic membrane is not erythematous or bulging.      Mouth/Throat:      Pharynx: Oropharynx is clear. Uvula midline. No pharyngeal swelling, oropharyngeal exudate, posterior oropharyngeal erythema, uvula swelling or postnasal drip.      Tonsils: No tonsillar exudate or tonsillar abscesses. 1+ on the right. 1+ on the left.   Eyes:      Conjunctiva/sclera: Conjunctivae normal.   Neck:      Thyroid: No thyroid mass, thyromegaly or thyroid tenderness.   Cardiovascular:      Rate and Rhythm: Normal rate and regular rhythm.      Pulses: Normal pulses.      Heart sounds: Normal heart sounds, S1 normal and S2 normal. Heart sounds not distant. No murmur heard.     No friction rub. No gallop.   Pulmonary:      Effort: Pulmonary effort is normal. No tachypnea, bradypnea, accessory muscle usage, prolonged expiration, respiratory distress or retractions. She is not intubated.      Breath sounds: Normal breath sounds. No stridor, decreased air movement or transmitted upper airway sounds. No decreased breath sounds, wheezing, rhonchi or rales.   Chest:      Chest wall: No tenderness.   Abdominal:      Palpations: Abdomen is soft.      Tenderness: There is no abdominal tenderness.   Musculoskeletal:         General: No swelling.      Cervical back: Full passive range of motion without pain, normal range of motion and neck supple. No spinous process tenderness or muscular tenderness. Normal range of motion.   Lymphadenopathy:      Cervical: No cervical adenopathy.      Right cervical: No superficial cervical adenopathy.     Left cervical: No superficial cervical adenopathy.   Skin:     General: Skin is warm and dry.      Capillary Refill: Capillary refill takes less than 2 seconds.   Neurological:      Mental Status: She is alert.  "  Psychiatric:         Mood and Affect: Mood normal.         Portions of the record may have been created with voice recognition software.  Occasional wrong word or \"sound a like\" substitutions may have occurred due to the inherent limitations of voice recognition software.  Read the chart carefully and recognize, using context, where substitutions have occurred.  "

## 2025-05-02 LAB — BACTERIA THROAT CULT: NORMAL

## 2025-05-06 LAB — S PYO AG THROAT QL: NEGATIVE
